# Patient Record
Sex: FEMALE | Race: WHITE | NOT HISPANIC OR LATINO
[De-identification: names, ages, dates, MRNs, and addresses within clinical notes are randomized per-mention and may not be internally consistent; named-entity substitution may affect disease eponyms.]

---

## 2017-02-23 ENCOUNTER — LABORATORY RESULT (OUTPATIENT)
Age: 77
End: 2017-02-23

## 2017-02-28 ENCOUNTER — APPOINTMENT (OUTPATIENT)
Dept: INTERNAL MEDICINE | Facility: CLINIC | Age: 77
End: 2017-02-28

## 2017-02-28 VITALS
DIASTOLIC BLOOD PRESSURE: 70 MMHG | BODY MASS INDEX: 19.92 KG/M2 | SYSTOLIC BLOOD PRESSURE: 136 MMHG | HEART RATE: 91 BPM | WEIGHT: 102 LBS

## 2017-03-03 ENCOUNTER — APPOINTMENT (OUTPATIENT)
Dept: OPHTHALMOLOGY | Facility: CLINIC | Age: 77
End: 2017-03-03

## 2017-03-03 DIAGNOSIS — H26.8 OTHER SPECIFIED CATARACT: ICD-10-CM

## 2017-03-06 ENCOUNTER — APPOINTMENT (OUTPATIENT)
Dept: OPHTHALMOLOGY | Facility: CLINIC | Age: 77
End: 2017-03-06

## 2017-03-06 DIAGNOSIS — H35.3212 EXUDATIVE AGE-RELATED MACULAR DEGENERATION, RIGHT EYE, WITH INACTIVE CHOROIDAL NEOVASCULARIZATION: ICD-10-CM

## 2017-03-06 DIAGNOSIS — H35.3221 EXUDATIVE AGE-RELATED MACULAR DEGENERATION, LEFT EYE, WITH ACTIVE CHOROIDAL NEOVASCULARIZATION: ICD-10-CM

## 2017-03-06 RX ORDER — PREDNISOLONE ACETATE 10 MG/ML
1 SUSPENSION/ DROPS OPHTHALMIC
Qty: 1 | Refills: 4 | Status: ACTIVE | COMMUNITY
Start: 2017-03-06 | End: 1900-01-01

## 2017-03-27 ENCOUNTER — APPOINTMENT (OUTPATIENT)
Dept: OPHTHALMOLOGY | Facility: CLINIC | Age: 77
End: 2017-03-27

## 2017-03-27 DIAGNOSIS — H15.091 OTHER SCLERITIS, RIGHT EYE: ICD-10-CM

## 2017-05-16 ENCOUNTER — APPOINTMENT (OUTPATIENT)
Dept: INTERNAL MEDICINE | Facility: CLINIC | Age: 77
End: 2017-05-16

## 2017-05-16 VITALS
WEIGHT: 106 LBS | HEART RATE: 92 BPM | OXYGEN SATURATION: 97 % | SYSTOLIC BLOOD PRESSURE: 132 MMHG | BODY MASS INDEX: 20.7 KG/M2 | DIASTOLIC BLOOD PRESSURE: 66 MMHG

## 2017-07-03 ENCOUNTER — RX RENEWAL (OUTPATIENT)
Age: 77
End: 2017-07-03

## 2017-08-25 ENCOUNTER — APPOINTMENT (OUTPATIENT)
Dept: INTERNAL MEDICINE | Facility: CLINIC | Age: 77
End: 2017-08-25
Payer: COMMERCIAL

## 2017-08-25 VITALS
BODY MASS INDEX: 19.92 KG/M2 | WEIGHT: 102 LBS | HEART RATE: 86 BPM | OXYGEN SATURATION: 98 % | DIASTOLIC BLOOD PRESSURE: 79 MMHG | SYSTOLIC BLOOD PRESSURE: 141 MMHG

## 2017-08-25 PROCEDURE — 99214 OFFICE O/P EST MOD 30 MIN: CPT

## 2017-09-05 ENCOUNTER — APPOINTMENT (OUTPATIENT)
Dept: INTERNAL MEDICINE | Facility: CLINIC | Age: 77
End: 2017-09-05

## 2017-09-26 ENCOUNTER — APPOINTMENT (OUTPATIENT)
Dept: INTERNAL MEDICINE | Facility: CLINIC | Age: 77
End: 2017-09-26
Payer: COMMERCIAL

## 2017-09-26 VITALS
OXYGEN SATURATION: 97 % | WEIGHT: 103 LBS | BODY MASS INDEX: 20.12 KG/M2 | DIASTOLIC BLOOD PRESSURE: 80 MMHG | HEART RATE: 92 BPM | SYSTOLIC BLOOD PRESSURE: 140 MMHG

## 2017-09-26 PROCEDURE — 99213 OFFICE O/P EST LOW 20 MIN: CPT

## 2018-01-19 ENCOUNTER — APPOINTMENT (OUTPATIENT)
Dept: INTERNAL MEDICINE | Facility: CLINIC | Age: 78
End: 2018-01-19
Payer: COMMERCIAL

## 2018-01-19 VITALS — HEART RATE: 88 BPM | DIASTOLIC BLOOD PRESSURE: 81 MMHG | SYSTOLIC BLOOD PRESSURE: 133 MMHG | OXYGEN SATURATION: 98 %

## 2018-01-19 PROCEDURE — 99214 OFFICE O/P EST MOD 30 MIN: CPT

## 2018-01-29 ENCOUNTER — LABORATORY RESULT (OUTPATIENT)
Age: 78
End: 2018-01-29

## 2018-02-04 ENCOUNTER — RESULT REVIEW (OUTPATIENT)
Age: 78
End: 2018-02-04

## 2018-03-06 ENCOUNTER — MEDICATION RENEWAL (OUTPATIENT)
Age: 78
End: 2018-03-06

## 2018-04-24 LAB
ALBUMIN SERPL ELPH-MCNC: 4.2 G/DL
ALP BLD-CCNC: 104 U/L
ALT SERPL-CCNC: 21 U/L
ANION GAP SERPL CALC-SCNC: 14 MMOL/L
AST SERPL-CCNC: 30 U/L
BASOPHILS # BLD AUTO: 0.33 K/UL
BASOPHILS NFR BLD AUTO: 5.2 %
BILIRUB SERPL-MCNC: 0.5 MG/DL
BUN SERPL-MCNC: 21 MG/DL
CALCIUM SERPL-MCNC: 9.8 MG/DL
CHLORIDE SERPL-SCNC: 102 MMOL/L
CHOLEST SERPL-MCNC: 209 MG/DL
CHOLEST/HDLC SERPL: 2 RATIO
CO2 SERPL-SCNC: 25 MMOL/L
CREAT SERPL-MCNC: 0.63 MG/DL
EOSINOPHIL # BLD AUTO: 0.28 K/UL
EOSINOPHIL NFR BLD AUTO: 4.3
GLUCOSE SERPL-MCNC: 107 MG/DL
HBA1C MFR BLD HPLC: 7.3 %
HCT VFR BLD CALC: 40.5 %
HDLC SERPL-MCNC: 103 MG/DL
HGB BLD-MCNC: 12.9 G/DL
LDLC SERPL CALC-MCNC: 94 MG/DL
LYMPHOCYTES # BLD AUTO: 0.89 K/UL
LYMPHOCYTES NFR BLD AUTO: 13.9 %
MAN DIFF?: NORMAL
MCHC RBC-ENTMCNC: 20.5 PG
MCHC RBC-ENTMCNC: 31.9 GM/DL
MCV RBC AUTO: 64.3 FL
MONOCYTES # BLD AUTO: 0.22 K/UL
MONOCYTES NFR BLD AUTO: 3.5 %
NEUTROPHILS # BLD AUTO: 4.52 K/UL
NEUTROPHILS NFR BLD AUTO: 70.4 %
PLATELET # BLD AUTO: 341 K/UL
POTASSIUM SERPL-SCNC: 5 MMOL/L
PROT SERPL-MCNC: 7.3 G/DL
RBC # BLD: 6.3 M/UL
RBC # FLD: 16.1 %
SODIUM SERPL-SCNC: 141 MMOL/L
TRIGL SERPL-MCNC: 58 MG/DL
TSH SERPL-ACNC: 1.92 UIU/ML
WBC # FLD AUTO: 6.42 K/UL

## 2018-05-22 ENCOUNTER — APPOINTMENT (OUTPATIENT)
Dept: INTERNAL MEDICINE | Facility: CLINIC | Age: 78
End: 2018-05-22
Payer: COMMERCIAL

## 2018-05-22 VITALS
WEIGHT: 103 LBS | DIASTOLIC BLOOD PRESSURE: 72 MMHG | HEART RATE: 79 BPM | OXYGEN SATURATION: 96 % | BODY MASS INDEX: 20.12 KG/M2 | SYSTOLIC BLOOD PRESSURE: 140 MMHG

## 2018-05-22 DIAGNOSIS — M06.9 RHEUMATOID ARTHRITIS, UNSPECIFIED: ICD-10-CM

## 2018-05-22 DIAGNOSIS — R79.9 ABNORMAL FINDING OF BLOOD CHEMISTRY, UNSPECIFIED: ICD-10-CM

## 2018-05-22 PROCEDURE — 99214 OFFICE O/P EST MOD 30 MIN: CPT

## 2018-05-26 NOTE — PLAN
[FreeTextEntry1] : Pt doing generally well.   if sensation difference occasional at le's keeps up or increases may check arterial circulation sonographically, but is instead abating now.  check labs by next month.  to see Cardiology soon for re-evaluation.  keeping active.  minding home FS after recent glipizide time of administration change, but seems to have helped and been safe thus far.

## 2018-05-26 NOTE — HISTORY OF PRESENT ILLNESS
[FreeTextEntry1] : follow-up [de-identified] : Pt has been feeling generally well.  son is coming in to visit this weekend and she is looking forward to this.  she has noted mild sensation of coolness at ankles and feet within last couple of weeks but this seems to be abating.  she has had some more ache at hands with variable and humid weather.  she would like to check bloods next month.  she has been tolerating exertion still well and working constantly.  no cp, no sob. will be seeing Dr. Van for repeat evaluation soon meanwhile.  pt has moved her pm dose of glipizide later in evening and has noted no hypoglycemias as well as better AM control of sugars.

## 2018-05-26 NOTE — PHYSICAL EXAM
[No Acute Distress] : no acute distress [Well Nourished] : well nourished [Well Developed] : well developed [Well-Appearing] : well-appearing [Normal Sclera/Conjunctiva] : normal sclera/conjunctiva [PERRL] : pupils equal round and reactive to light [EOMI] : extraocular movements intact [Normal Outer Ear/Nose] : the outer ears and nose were normal in appearance [Normal Oropharynx] : the oropharynx was normal [No JVD] : no jugular venous distention [Supple] : supple [No Lymphadenopathy] : no lymphadenopathy [Thyroid Normal, No Nodules] : the thyroid was normal and there were no nodules present [No Respiratory Distress] : no respiratory distress  [Clear to Auscultation] : lungs were clear to auscultation bilaterally [No Accessory Muscle Use] : no accessory muscle use [Normal Rate] : normal rate  [Regular Rhythm] : with a regular rhythm [Normal S1, S2] : normal S1 and S2 [No Murmur] : no murmur heard [No Carotid Bruits] : no carotid bruits [No Abdominal Bruit] : a ~M bruit was not heard ~T in the abdomen [No Varicosities] : no varicosities [Pedal Pulses Present] : the pedal pulses are present [No Edema] : there was no peripheral edema [No Extremity Clubbing/Cyanosis] : no extremity clubbing/cyanosis [No Palpable Aorta] : no palpable aorta [Soft] : abdomen soft [Non Tender] : non-tender [Non-distended] : non-distended [No Masses] : no abdominal mass palpated [No HSM] : no HSM [Normal Bowel Sounds] : normal bowel sounds [Normal Posterior Cervical Nodes] : no posterior cervical lymphadenopathy [Normal Anterior Cervical Nodes] : no anterior cervical lymphadenopathy [No CVA Tenderness] : no CVA  tenderness [No Spinal Tenderness] : no spinal tenderness [Grossly Normal Strength/Tone] : grossly normal strength/tone [No Rash] : no rash [Normal Gait] : normal gait [Coordination Grossly Intact] : coordination grossly intact [No Focal Deficits] : no focal deficits [Deep Tendon Reflexes (DTR)] : deep tendon reflexes were 2+ and symmetric [Normal Affect] : the affect was normal [Normal Insight/Judgement] : insight and judgment were intact [Comprehensive Foot Exam Normal] : Right and left foot were examined and both feet are normal. No ulcers in either foot. Toes are normal and with full ROM.  Normal tactile sensation with monofilament testing throughout both feet [de-identified] : mild vascular markings lower le's. dp's 1+ b [de-identified] : fingers with nodularity, rheumatic

## 2018-05-30 ENCOUNTER — NON-APPOINTMENT (OUTPATIENT)
Age: 78
End: 2018-05-30

## 2018-05-30 ENCOUNTER — APPOINTMENT (OUTPATIENT)
Dept: CARDIOLOGY | Facility: CLINIC | Age: 78
End: 2018-05-30
Payer: COMMERCIAL

## 2018-05-30 VITALS
OXYGEN SATURATION: 96 % | HEART RATE: 85 BPM | HEIGHT: 60 IN | SYSTOLIC BLOOD PRESSURE: 146 MMHG | WEIGHT: 105 LBS | BODY MASS INDEX: 20.62 KG/M2 | DIASTOLIC BLOOD PRESSURE: 68 MMHG

## 2018-05-30 DIAGNOSIS — R00.2 PALPITATIONS: ICD-10-CM

## 2018-05-30 PROCEDURE — 93000 ELECTROCARDIOGRAM COMPLETE: CPT

## 2018-05-30 PROCEDURE — 99214 OFFICE O/P EST MOD 30 MIN: CPT

## 2018-06-15 ENCOUNTER — LABORATORY RESULT (OUTPATIENT)
Age: 78
End: 2018-06-15

## 2018-06-20 LAB
ALBUMIN SERPL ELPH-MCNC: 4.3 G/DL
ALP BLD-CCNC: 99 U/L
ALT SERPL-CCNC: 19 U/L
ANION GAP SERPL CALC-SCNC: 17 MMOL/L
AST SERPL-CCNC: 21 U/L
BASOPHILS # BLD AUTO: 0.06 K/UL
BASOPHILS NFR BLD AUTO: 0.8 %
BILIRUB SERPL-MCNC: 0.5 MG/DL
BUN SERPL-MCNC: 28 MG/DL
CALCIUM SERPL-MCNC: 9.2 MG/DL
CHLORIDE SERPL-SCNC: 100 MMOL/L
CO2 SERPL-SCNC: 24 MMOL/L
CREAT SERPL-MCNC: 0.6 MG/DL
CREAT SPEC-SCNC: 32 MG/DL
EOSINOPHIL # BLD AUTO: 0.16 K/UL
EOSINOPHIL NFR BLD AUTO: 2.2 %
FOLATE SERPL-MCNC: 14.7 NG/ML
GLUCOSE SERPL-MCNC: 121 MG/DL
HBA1C MFR BLD HPLC: 7.2 %
HCT VFR BLD CALC: 39.9 %
HGB BLD-MCNC: 12.4 G/DL
IMM GRANULOCYTES NFR BLD AUTO: 0.4 %
LYMPHOCYTES # BLD AUTO: 1.6 K/UL
LYMPHOCYTES NFR BLD AUTO: 22.1 %
MAN DIFF?: NORMAL
MCHC RBC-ENTMCNC: 19.9 PG
MCHC RBC-ENTMCNC: 31.1 GM/DL
MCV RBC AUTO: 64.1 FL
MICROALBUMIN 24H UR DL<=1MG/L-MCNC: 0.4 MG/DL
MICROALBUMIN/CREAT 24H UR-RTO: 13 MG/G
MONOCYTES # BLD AUTO: 0.47 K/UL
MONOCYTES NFR BLD AUTO: 6.5 %
NEUTROPHILS # BLD AUTO: 4.92 K/UL
NEUTROPHILS NFR BLD AUTO: 68 %
PLATELET # BLD AUTO: 341 K/UL
POTASSIUM SERPL-SCNC: 5.1 MMOL/L
PROT SERPL-MCNC: 6.7 G/DL
RBC # BLD: 6.22 M/UL
RBC # FLD: 16.9 %
SODIUM SERPL-SCNC: 141 MMOL/L
VIT B12 SERPL-MCNC: 532 PG/ML
WBC # FLD AUTO: 7.24 K/UL

## 2018-07-18 ENCOUNTER — EMERGENCY (EMERGENCY)
Facility: HOSPITAL | Age: 78
LOS: 1 days | Discharge: HOME | End: 2018-07-18
Attending: EMERGENCY MEDICINE
Payer: COMMERCIAL

## 2018-07-18 VITALS
TEMPERATURE: 98 F | WEIGHT: 104.06 LBS | DIASTOLIC BLOOD PRESSURE: 76 MMHG | HEART RATE: 113 BPM | HEIGHT: 64 IN | OXYGEN SATURATION: 97 % | SYSTOLIC BLOOD PRESSURE: 176 MMHG | RESPIRATION RATE: 18 BRPM

## 2018-07-18 VITALS
SYSTOLIC BLOOD PRESSURE: 152 MMHG | HEART RATE: 89 BPM | OXYGEN SATURATION: 97 % | DIASTOLIC BLOOD PRESSURE: 74 MMHG | RESPIRATION RATE: 18 BRPM

## 2018-07-18 PROCEDURE — 73590 X-RAY EXAM OF LOWER LEG: CPT | Mod: 26,LT

## 2018-07-18 PROCEDURE — 73590 X-RAY EXAM OF LOWER LEG: CPT

## 2018-07-18 PROCEDURE — 99283 EMERGENCY DEPT VISIT LOW MDM: CPT

## 2018-07-18 RX ORDER — ACETAMINOPHEN 500 MG
975 TABLET ORAL ONCE
Qty: 0 | Refills: 0 | Status: COMPLETED | OUTPATIENT
Start: 2018-07-18 | End: 2018-07-18

## 2018-07-18 RX ADMIN — Medication 975 MILLIGRAM(S): at 13:37

## 2018-07-18 RX ADMIN — Medication 975 MILLIGRAM(S): at 12:42

## 2018-07-18 NOTE — ED PROVIDER NOTE - OBJECTIVE STATEMENT
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 leg hematoma that developed after opening the car door of her new silver [place] on her leg. Patient states that the incident occurred at 8 am and she was able to walk here and even drove herself here. Patient tried icing the hematoma and visited her podiatrist who recommended she come to ER for evaluation. 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 leg hematoma that developed after opening the car door of her new silver Saira on her leg. Patient states that the incident occurred at 8 am and she was able to walk here and even drove herself here. Patient tried icing the hematoma and visited her podiatrist who recommended she come to ER for evaluation.

## 2018-07-18 NOTE — ED PROVIDER NOTE - NS ED ROS FT
General: no fever, no chills  Ophthalmologic: no change in vision  ENMT: no sore throat  Respiratory and Thorax: no SOB, no cough  Cardiovascular: no CP  Gastrointestinal: no abd pain, N/V/D  Genitourinary: no dysuria  Musculoskeletal: +burning pain of RLE, hematoma of RLE, bruising.   Neurological: no HA  Psychiatric: no anxiety/depression  Hematology/Lymphatics: no abnormal bleeding  Endocrine: no changes in weight General: no fever, no chills  Ophthalmologic: no change in vision  ENMT: no sore throat  Respiratory and Thorax: no SOB, no cough  Cardiovascular: no CP  Gastrointestinal: no abd pain, N/V/D  Genitourinary: no dysuria  Musculoskeletal: +burning pain of LLE, hematoma of LLE, bruising.   Neurological: no HA  Psychiatric: no anxiety/depression  Hematology/Lymphatics: no abnormal bleeding  Endocrine: no changes in weight

## 2018-07-18 NOTE — ED PROVIDER NOTE - PHYSICAL EXAMINATION
Attending MD Brumfield: A & O x 3, NAD, extremities with no edema; LLE: large hematoma of proximal shin lateral aspect, hematoma not tense, posterior and medial calf compartments soft, distally NV intact b/l LEs, nontender left knee, left thigh and left hip. skin intact b/l LEs, affect appropriate. neuro exam non focal with no motor or sensory deficits.

## 2018-07-18 NOTE — ED ADULT NURSE NOTE - OBJECTIVE STATEMENT
78 y/o F, PMH HTN, DM, osteoarthritis, Rheumatoid arthritis, presents ambulatory to ED c/o L lateral upper shin pain/swelling/bruising. Pt reports at approx 0800 she opened the car door into her L lateral upper shin. Pt takes 1 baby asa/day. Pt reports she noticed the bruising/swelling developed over the next 15 minutes but she had to finish her laundry, she applied ice, went to the Podiatrist who had to cut her toenails, he also applied ice, recommended she come into the ED. Pt has mild redness to b/l lower ankles, states "my doctor told me I have vasculitis, sometimes it itches so I put witch hazel on it." No streaking noted to lower legs b/l. No bleeding, opening, abrasion, lac, drainage noted to L lateral upper shin bruise. Pt reports baseline numbness/tingling r/t DM, no worsening. Pt ambulates intermittently with cane when her osteoarthritis is worse, pt came in with cane today. Safety maintained.

## 2018-07-18 NOTE — ED PROVIDER NOTE - MEDICAL DECISION MAKING DETAILS
Attending MD Brumfield: 77F with hematoma to left shin after striking it with car door 4 hours PTA, exam with localized soft tissue hematoma to left shin, calf comparments soft, no e/o compartment syndrome currently. Pt on ASA daily, no other anticoagulation. Plan for XR to ro underlying fx, expectant management with compressive bandage, elevation

## 2018-07-18 NOTE — ED PROVIDER NOTE - CARE PLAN
Principal Discharge DX:	Hematoma of left lower extremity, initial encounter Principal Discharge DX:	Hematoma of left lower extremity, initial encounter  Assessment and plan of treatment:	-xray negative for fracture  -discharge home with compressive dressing

## 2018-07-18 NOTE — ED ADULT TRIAGE NOTE - CHIEF COMPLAINT QUOTE
left leg injury this AM. patient states she opened up car door in to leg. partial weight bearing. bruising and swelling noted to anterior lower left extremity

## 2018-07-18 NOTE — ED PROVIDER NOTE - ATTENDING CONTRIBUTION TO CARE
Attending MD Brumfield:  I personally have seen and examined this patient.  Resident note reviewed and agree on plan of care and except where noted.  See HPI, PE, and MDM for details.

## 2018-07-18 NOTE — ED ADULT NURSE NOTE - CHPI ED SYMPTOMS NEG
no bleeding/no chills/no bleeding at site/no purulent drainage/no vomiting/no fever/no drainage/no red streaks

## 2018-07-18 NOTE — ED PROVIDER NOTE - PROGRESS NOTE DETAILS
Patient able to ambulate with her cane to bathroom. Attending MD Brumfield: XR prelim negative for fx. Re-examination of left shin reveals stable hematoma size, not tense. ACE wrap applied. Patient instructed to hold aspirin until cleared to resume by her PMD

## 2018-07-20 ENCOUNTER — APPOINTMENT (OUTPATIENT)
Dept: INTERNAL MEDICINE | Facility: CLINIC | Age: 78
End: 2018-07-20
Payer: COMMERCIAL

## 2018-07-20 PROCEDURE — 99214 OFFICE O/P EST MOD 30 MIN: CPT

## 2018-07-24 ENCOUNTER — APPOINTMENT (OUTPATIENT)
Dept: INTERNAL MEDICINE | Facility: CLINIC | Age: 78
End: 2018-07-24
Payer: COMMERCIAL

## 2018-07-24 VITALS
WEIGHT: 108 LBS | BODY MASS INDEX: 21.09 KG/M2 | HEART RATE: 92 BPM | OXYGEN SATURATION: 98 % | SYSTOLIC BLOOD PRESSURE: 130 MMHG | DIASTOLIC BLOOD PRESSURE: 62 MMHG

## 2018-07-24 PROCEDURE — 99214 OFFICE O/P EST MOD 30 MIN: CPT

## 2018-07-25 ENCOUNTER — FORM ENCOUNTER (OUTPATIENT)
Age: 78
End: 2018-07-25

## 2018-07-26 ENCOUNTER — OUTPATIENT (OUTPATIENT)
Dept: OUTPATIENT SERVICES | Facility: HOSPITAL | Age: 78
LOS: 1 days | End: 2018-07-26
Payer: COMMERCIAL

## 2018-07-26 ENCOUNTER — APPOINTMENT (OUTPATIENT)
Dept: ULTRASOUND IMAGING | Facility: CLINIC | Age: 78
End: 2018-07-26

## 2018-07-26 DIAGNOSIS — T14.8XXA OTHER INJURY OF UNSPECIFIED BODY REGION, INITIAL ENCOUNTER: ICD-10-CM

## 2018-07-26 PROCEDURE — 93971 EXTREMITY STUDY: CPT

## 2018-07-26 PROCEDURE — 93971 EXTREMITY STUDY: CPT | Mod: 26

## 2018-07-27 ENCOUNTER — MEDICATION RENEWAL (OUTPATIENT)
Age: 78
End: 2018-07-27

## 2018-08-01 NOTE — HISTORY OF PRESENT ILLNESS
[FreeTextEntry1] : hematoma left leg [de-identified] : Pt comes in following from ED visit for hematoma l le. developed a large hematoma at anterolateral aspect l foreleg after opened car door and struck this part of the leg abruptly.  she went to ed because it developed rapidly into large protruding hematoma.  xr showed no fx underlying, and it was determined that she was not likely to have venous thrombus nor compartment syndrome.  she had initially more pain, then more itching.  was told to stop asa for now.  has had some bluish coloration and some redness below hematoma but no fever, no major alteration in blood glucose readings.

## 2018-08-01 NOTE — PLAN
[FreeTextEntry1] : will hold asa x 2 weeks, elevate leg whenever able, use ice (covered in cloth to minimize impact upon skin itself) upon hematoma to help resolve more swiftly.  will limit activities to a degree over next several days. anticipate ecchymosis tracking inferiorly but pt will report in if with increase in erythema, heat, body temp, abnl glucoses, or if with mounting pain inferiorly or at level of current hematoma.  near-term follow-up in office.

## 2018-08-01 NOTE — PHYSICAL EXAM
[No Acute Distress] : no acute distress [Well Nourished] : well nourished [Well Developed] : well developed [Well-Appearing] : well-appearing [Normal Sclera/Conjunctiva] : normal sclera/conjunctiva [PERRL] : pupils equal round and reactive to light [EOMI] : extraocular movements intact [Normal Outer Ear/Nose] : the outer ears and nose were normal in appearance [Normal Oropharynx] : the oropharynx was normal [No JVD] : no jugular venous distention [Supple] : supple [No Lymphadenopathy] : no lymphadenopathy [Thyroid Normal, No Nodules] : the thyroid was normal and there were no nodules present [No Respiratory Distress] : no respiratory distress  [Clear to Auscultation] : lungs were clear to auscultation bilaterally [No Accessory Muscle Use] : no accessory muscle use [Normal Rate] : normal rate  [Regular Rhythm] : with a regular rhythm [Normal S1, S2] : normal S1 and S2 [No Murmur] : no murmur heard [No Carotid Bruits] : no carotid bruits [No Abdominal Bruit] : a ~M bruit was not heard ~T in the abdomen [No Varicosities] : no varicosities [Pedal Pulses Present] : the pedal pulses are present [No Edema] : there was no peripheral edema [No Extremity Clubbing/Cyanosis] : no extremity clubbing/cyanosis [No Palpable Aorta] : no palpable aorta [Soft] : abdomen soft [Non Tender] : non-tender [Non-distended] : non-distended [No Masses] : no abdominal mass palpated [No HSM] : no HSM [Normal Bowel Sounds] : normal bowel sounds [Normal Posterior Cervical Nodes] : no posterior cervical lymphadenopathy [Normal Anterior Cervical Nodes] : no anterior cervical lymphadenopathy [No CVA Tenderness] : no CVA  tenderness [No Spinal Tenderness] : no spinal tenderness [Grossly Normal Strength/Tone] : grossly normal strength/tone [No Rash] : no rash [Normal Gait] : normal gait [Coordination Grossly Intact] : coordination grossly intact [No Focal Deficits] : no focal deficits [Deep Tendon Reflexes (DTR)] : deep tendon reflexes were 2+ and symmetric [Normal Affect] : the affect was normal [Normal Insight/Judgement] : insight and judgment were intact [Comprehensive Foot Exam Normal] : Right and left foot were examined and both feet are normal. No ulcers in either foot. Toes are normal and with full ROM.  Normal tactile sensation with monofilament testing throughout both feet [de-identified] : large hematoma l anterolateral foreleg - approx 6 cm x 4 cm.  mild erythema inferior to this, some tracking ecchymosis inferior to this.  skin remains pliable around remainder of leg.  no abnl warmth at areas erythema. dp's 1+ b u nchanged. [de-identified] : fingers with nodularity, rheumatic

## 2018-08-05 NOTE — HISTORY OF PRESENT ILLNESS
[FreeTextEntry1] : follow up hematoma [de-identified] : Pt has noted reduction in size of hematoma though remains itchy.  has been a bit more red beneath site however from time to time as well as more bluish.  ice does not make it feel better at this point.  she has had no fevers, no drainage, no bleeding.

## 2018-08-05 NOTE — ASSESSMENT
[FreeTextEntry1] : Pt with resolving hematoma secondary to trauma at lower l leg.  persistent swelling, some increase in erythema; no horace signs of dvt but will check sono at this point.  pt requests abx to have on hand should develop greater pain/fevers/erythema at site but her ability to take abx is limited by multiple tolerability issues.  will provide with multiple caveats - no need to apply medication tx as yet but will speak again after sono performed.  leg elevation, easy leg stretching, local heat.

## 2018-08-05 NOTE — PHYSICAL EXAM
[No Acute Distress] : no acute distress [Well Nourished] : well nourished [Well Developed] : well developed [Well-Appearing] : well-appearing [Normal Sclera/Conjunctiva] : normal sclera/conjunctiva [PERRL] : pupils equal round and reactive to light [EOMI] : extraocular movements intact [Normal Outer Ear/Nose] : the outer ears and nose were normal in appearance [Normal Oropharynx] : the oropharynx was normal [No JVD] : no jugular venous distention [Supple] : supple [No Lymphadenopathy] : no lymphadenopathy [Thyroid Normal, No Nodules] : the thyroid was normal and there were no nodules present [No Respiratory Distress] : no respiratory distress  [Clear to Auscultation] : lungs were clear to auscultation bilaterally [No Accessory Muscle Use] : no accessory muscle use [Normal Rate] : normal rate  [Regular Rhythm] : with a regular rhythm [Normal S1, S2] : normal S1 and S2 [No Murmur] : no murmur heard [No Carotid Bruits] : no carotid bruits [No Abdominal Bruit] : a ~M bruit was not heard ~T in the abdomen [No Varicosities] : no varicosities [Pedal Pulses Present] : the pedal pulses are present [No Edema] : there was no peripheral edema [No Extremity Clubbing/Cyanosis] : no extremity clubbing/cyanosis [No Palpable Aorta] : no palpable aorta [Soft] : abdomen soft [Non Tender] : non-tender [Non-distended] : non-distended [No Masses] : no abdominal mass palpated [No HSM] : no HSM [Normal Bowel Sounds] : normal bowel sounds [Normal Posterior Cervical Nodes] : no posterior cervical lymphadenopathy [Normal Anterior Cervical Nodes] : no anterior cervical lymphadenopathy [No CVA Tenderness] : no CVA  tenderness [No Spinal Tenderness] : no spinal tenderness [Grossly Normal Strength/Tone] : grossly normal strength/tone [No Rash] : no rash [Normal Gait] : normal gait [Coordination Grossly Intact] : coordination grossly intact [No Focal Deficits] : no focal deficits [Deep Tendon Reflexes (DTR)] : deep tendon reflexes were 2+ and symmetric [Normal Affect] : the affect was normal [Normal Insight/Judgement] : insight and judgment were intact [Comprehensive Foot Exam Normal] : Right and left foot were examined and both feet are normal. No ulcers in either foot. Toes are normal and with full ROM.  Normal tactile sensation with monofilament testing throughout both feet [de-identified] : large hematoma l anterolateral foreleg - approx 4 cm x 3 cm.  mild erythema inferior to this, some tracking ecchymosis inferior to this.  skin remains pliable around remainder of leg.  no abnl warmth at areas erythema. dp's 1+ b u nchanged.  slightly greater edema inf to hematoma c/w r le. [de-identified] : fingers with nodularity, rheumatic

## 2018-08-13 ENCOUNTER — APPOINTMENT (OUTPATIENT)
Dept: INTERNAL MEDICINE | Facility: CLINIC | Age: 78
End: 2018-08-13
Payer: COMMERCIAL

## 2018-08-13 VITALS
WEIGHT: 107 LBS | DIASTOLIC BLOOD PRESSURE: 60 MMHG | BODY MASS INDEX: 20.9 KG/M2 | SYSTOLIC BLOOD PRESSURE: 128 MMHG | HEART RATE: 94 BPM | OXYGEN SATURATION: 96 %

## 2018-08-13 DIAGNOSIS — T14.8XXA OTHER INJURY OF UNSPECIFIED BODY REGION, INITIAL ENCOUNTER: ICD-10-CM

## 2018-08-13 PROCEDURE — 99214 OFFICE O/P EST MOD 30 MIN: CPT

## 2018-08-19 PROBLEM — T14.8XXA HEMATOMA: Status: ACTIVE | Noted: 2018-07-24

## 2018-08-19 NOTE — PLAN
[FreeTextEntry1] : hematoma is decreasing in size to some degree, but is still notable.  pt today notes some consistency to sensory deficit in small region directly inferior to hematoma.  no fever, no motor function deficit.  she will continue to elevate, use some local heat. encouraging light compression as well.  however, lack of momentum towards [place] resolution of this hematoma, creating local nn. compression of note -- will refer to General Surgery for assessment, possible hematoma evacuation.  glucoses have remained at baseline, mostly nl range.

## 2018-08-19 NOTE — PHYSICAL EXAM
[No Acute Distress] : no acute distress [Well Nourished] : well nourished [Well Developed] : well developed [Well-Appearing] : well-appearing [Normal Sclera/Conjunctiva] : normal sclera/conjunctiva [PERRL] : pupils equal round and reactive to light [EOMI] : extraocular movements intact [Normal Outer Ear/Nose] : the outer ears and nose were normal in appearance [Normal Oropharynx] : the oropharynx was normal [No JVD] : no jugular venous distention [Supple] : supple [No Lymphadenopathy] : no lymphadenopathy [Thyroid Normal, No Nodules] : the thyroid was normal and there were no nodules present [No Respiratory Distress] : no respiratory distress  [Clear to Auscultation] : lungs were clear to auscultation bilaterally [No Accessory Muscle Use] : no accessory muscle use [Normal Rate] : normal rate  [Regular Rhythm] : with a regular rhythm [Normal S1, S2] : normal S1 and S2 [No Murmur] : no murmur heard [No Carotid Bruits] : no carotid bruits [No Abdominal Bruit] : a ~M bruit was not heard ~T in the abdomen [No Varicosities] : no varicosities [Pedal Pulses Present] : the pedal pulses are present [No Edema] : there was no peripheral edema [No Extremity Clubbing/Cyanosis] : no extremity clubbing/cyanosis [No Palpable Aorta] : no palpable aorta [Soft] : abdomen soft [Non Tender] : non-tender [Non-distended] : non-distended [No Masses] : no abdominal mass palpated [No HSM] : no HSM [Normal Bowel Sounds] : normal bowel sounds [Normal Posterior Cervical Nodes] : no posterior cervical lymphadenopathy [Normal Anterior Cervical Nodes] : no anterior cervical lymphadenopathy [No CVA Tenderness] : no CVA  tenderness [No Spinal Tenderness] : no spinal tenderness [Grossly Normal Strength/Tone] : grossly normal strength/tone [No Rash] : no rash [Normal Gait] : normal gait [Coordination Grossly Intact] : coordination grossly intact [No Focal Deficits] : no focal deficits [Deep Tendon Reflexes (DTR)] : deep tendon reflexes were 2+ and symmetric [Normal Affect] : the affect was normal [Normal Insight/Judgement] : insight and judgment were intact [Comprehensive Foot Exam Normal] : Right and left foot were examined and both feet are normal. No ulcers in either foot. Toes are normal and with full ROM.  Normal tactile sensation with monofilament testing throughout both feet [de-identified] : large hematoma l anterolateral foreleg - approx 3 cm x 3 cm.  mild erythema inferior to this, fades with elevation.  skin remains pliable around remainder of leg.  no abnl warmth at areas erythema. dp's 1+ b u nchanged.  [de-identified] : fingers with nodularity, rheumatic

## 2018-08-19 NOTE — HISTORY OF PRESENT ILLNESS
[FreeTextEntry1] : follow up hematoma [de-identified] : Pt's leg hematoma is still present.  it is getting softer, more pliable, but is still prominent. it still seems to generate occasional dysesthesias/paresthesias.  has not led to motor impairment.  however she notes some decrease in sensation at area which she describes as a vertically oriented rectangle inferior to the hematoma. she has used local heat, elevation. has not been using light compression. denies fevers. in care of the region, has not disrupted at all.  skin intact.  tendency to mild erythema at area but also remits easily.  has not diminished her activities or schedule.  she continues engage in full work and social activities though it does trouble her just about all the time.

## 2018-08-21 ENCOUNTER — APPOINTMENT (OUTPATIENT)
Dept: SURGERY | Facility: CLINIC | Age: 78
End: 2018-08-21
Payer: COMMERCIAL

## 2018-08-21 VITALS
WEIGHT: 106 LBS | HEIGHT: 64 IN | SYSTOLIC BLOOD PRESSURE: 146 MMHG | DIASTOLIC BLOOD PRESSURE: 76 MMHG | TEMPERATURE: 98.3 F | BODY MASS INDEX: 18.1 KG/M2 | HEART RATE: 89 BPM

## 2018-08-21 PROCEDURE — 99241 OFFICE CONSULTATION NEW/ESTAB PATIENT 15 MIN: CPT

## 2018-08-23 ENCOUNTER — MEDICATION RENEWAL (OUTPATIENT)
Age: 78
End: 2018-08-23

## 2018-08-29 ENCOUNTER — APPOINTMENT (OUTPATIENT)
Dept: SURGERY | Facility: CLINIC | Age: 78
End: 2018-08-29
Payer: COMMERCIAL

## 2018-08-29 PROCEDURE — 99213 OFFICE O/P EST LOW 20 MIN: CPT

## 2018-08-31 ENCOUNTER — RESULT CHARGE (OUTPATIENT)
Age: 78
End: 2018-08-31

## 2018-08-31 ENCOUNTER — APPOINTMENT (OUTPATIENT)
Dept: INTERNAL MEDICINE | Facility: CLINIC | Age: 78
End: 2018-08-31

## 2018-10-01 ENCOUNTER — EMERGENCY (EMERGENCY)
Facility: HOSPITAL | Age: 78
LOS: 1 days | Discharge: HOME | End: 2018-10-01
Attending: EMERGENCY MEDICINE
Payer: COMMERCIAL

## 2018-10-01 VITALS
HEART RATE: 100 BPM | OXYGEN SATURATION: 100 % | RESPIRATION RATE: 16 BRPM | SYSTOLIC BLOOD PRESSURE: 138 MMHG | DIASTOLIC BLOOD PRESSURE: 68 MMHG

## 2018-10-01 VITALS
DIASTOLIC BLOOD PRESSURE: 74 MMHG | SYSTOLIC BLOOD PRESSURE: 181 MMHG | RESPIRATION RATE: 18 BRPM | TEMPERATURE: 98 F | HEART RATE: 111 BPM | OXYGEN SATURATION: 97 %

## 2018-10-01 DIAGNOSIS — Z98.891 HISTORY OF UTERINE SCAR FROM PREVIOUS SURGERY: Chronic | ICD-10-CM

## 2018-10-01 DIAGNOSIS — N93.9 ABNORMAL UTERINE AND VAGINAL BLEEDING, UNSPECIFIED: ICD-10-CM

## 2018-10-01 LAB
ALBUMIN SERPL ELPH-MCNC: 3.4 G/DL — SIGNIFICANT CHANGE UP (ref 3.3–5)
ALP SERPL-CCNC: 79 U/L — SIGNIFICANT CHANGE UP (ref 40–120)
ALT FLD-CCNC: 10 U/L — SIGNIFICANT CHANGE UP (ref 10–45)
ANION GAP SERPL CALC-SCNC: 12 MMOL/L — SIGNIFICANT CHANGE UP (ref 5–17)
APTT BLD: 30.6 SEC — SIGNIFICANT CHANGE UP (ref 27.5–37.4)
AST SERPL-CCNC: 15 U/L — SIGNIFICANT CHANGE UP (ref 10–40)
BASOPHILS # BLD AUTO: 0.1 K/UL — SIGNIFICANT CHANGE UP (ref 0–0.2)
BASOPHILS NFR BLD AUTO: 0.8 % — SIGNIFICANT CHANGE UP (ref 0–2)
BILIRUB SERPL-MCNC: 0.3 MG/DL — SIGNIFICANT CHANGE UP (ref 0.2–1.2)
BLD GP AB SCN SERPL QL: NEGATIVE — SIGNIFICANT CHANGE UP
BUN SERPL-MCNC: 17 MG/DL — SIGNIFICANT CHANGE UP (ref 7–23)
CALCIUM SERPL-MCNC: 9 MG/DL — SIGNIFICANT CHANGE UP (ref 8.4–10.5)
CHLORIDE SERPL-SCNC: 100 MMOL/L — SIGNIFICANT CHANGE UP (ref 96–108)
CO2 SERPL-SCNC: 23 MMOL/L — SIGNIFICANT CHANGE UP (ref 22–31)
CREAT SERPL-MCNC: 0.48 MG/DL — LOW (ref 0.5–1.3)
EOSINOPHIL # BLD AUTO: 0.1 K/UL — SIGNIFICANT CHANGE UP (ref 0–0.5)
EOSINOPHIL NFR BLD AUTO: 1.8 % — SIGNIFICANT CHANGE UP (ref 0–6)
GAS PNL BLDV: SIGNIFICANT CHANGE UP
GLUCOSE SERPL-MCNC: 166 MG/DL — HIGH (ref 70–99)
HCT VFR BLD CALC: 35.3 % — SIGNIFICANT CHANGE UP (ref 34.5–45)
HCT VFR BLD CALC: 37.8 % — SIGNIFICANT CHANGE UP (ref 34.5–45)
HGB BLD-MCNC: 11.1 G/DL — LOW (ref 11.5–15.5)
HGB BLD-MCNC: 11.9 G/DL — SIGNIFICANT CHANGE UP (ref 11.5–15.5)
INR BLD: 1.17 RATIO — HIGH (ref 0.88–1.16)
LYMPHOCYTES # BLD AUTO: 1 K/UL — SIGNIFICANT CHANGE UP (ref 1–3.3)
LYMPHOCYTES # BLD AUTO: 13.4 % — SIGNIFICANT CHANGE UP (ref 13–44)
MCHC RBC-ENTMCNC: 20.3 PG — LOW (ref 27–34)
MCHC RBC-ENTMCNC: 20.3 PG — LOW (ref 27–34)
MCHC RBC-ENTMCNC: 31.5 GM/DL — LOW (ref 32–36)
MCHC RBC-ENTMCNC: 31.6 GM/DL — LOW (ref 32–36)
MCV RBC AUTO: 64.5 FL — LOW (ref 80–100)
MCV RBC AUTO: 64.6 FL — LOW (ref 80–100)
MONOCYTES # BLD AUTO: 0.7 K/UL — SIGNIFICANT CHANGE UP (ref 0–0.9)
MONOCYTES NFR BLD AUTO: 9.5 % — SIGNIFICANT CHANGE UP (ref 2–14)
NEUTROPHILS # BLD AUTO: 5.5 K/UL — SIGNIFICANT CHANGE UP (ref 1.8–7.4)
NEUTROPHILS NFR BLD AUTO: 74.5 % — SIGNIFICANT CHANGE UP (ref 43–77)
PLATELET # BLD AUTO: 436 K/UL — HIGH (ref 150–400)
PLATELET # BLD AUTO: 493 K/UL — HIGH (ref 150–400)
POTASSIUM SERPL-MCNC: 4.2 MMOL/L — SIGNIFICANT CHANGE UP (ref 3.5–5.3)
POTASSIUM SERPL-SCNC: 4.2 MMOL/L — SIGNIFICANT CHANGE UP (ref 3.5–5.3)
PROT SERPL-MCNC: 6.7 G/DL — SIGNIFICANT CHANGE UP (ref 6–8.3)
PROTHROM AB SERPL-ACNC: 12.8 SEC — HIGH (ref 9.8–12.7)
RBC # BLD: 5.45 M/UL — HIGH (ref 3.8–5.2)
RBC # BLD: 5.87 M/UL — HIGH (ref 3.8–5.2)
RBC # FLD: 15.2 % — HIGH (ref 10.3–14.5)
RBC # FLD: 15.5 % — HIGH (ref 10.3–14.5)
RH IG SCN BLD-IMP: POSITIVE — SIGNIFICANT CHANGE UP
SODIUM SERPL-SCNC: 135 MMOL/L — SIGNIFICANT CHANGE UP (ref 135–145)
WBC # BLD: 7.4 K/UL — SIGNIFICANT CHANGE UP (ref 3.8–10.5)
WBC # BLD: 9 K/UL — SIGNIFICANT CHANGE UP (ref 3.8–10.5)
WBC # FLD AUTO: 7.4 K/UL — SIGNIFICANT CHANGE UP (ref 3.8–10.5)
WBC # FLD AUTO: 9 K/UL — SIGNIFICANT CHANGE UP (ref 3.8–10.5)

## 2018-10-01 PROCEDURE — 99284 EMERGENCY DEPT VISIT MOD MDM: CPT

## 2018-10-01 PROCEDURE — 86900 BLOOD TYPING SEROLOGIC ABO: CPT

## 2018-10-01 PROCEDURE — 85730 THROMBOPLASTIN TIME PARTIAL: CPT

## 2018-10-01 PROCEDURE — 85610 PROTHROMBIN TIME: CPT

## 2018-10-01 PROCEDURE — 99284 EMERGENCY DEPT VISIT MOD MDM: CPT | Mod: 25

## 2018-10-01 PROCEDURE — 76856 US EXAM PELVIC COMPLETE: CPT | Mod: 26,59

## 2018-10-01 PROCEDURE — 86850 RBC ANTIBODY SCREEN: CPT

## 2018-10-01 PROCEDURE — 74177 CT ABD & PELVIS W/CONTRAST: CPT | Mod: 26

## 2018-10-01 PROCEDURE — 86901 BLOOD TYPING SEROLOGIC RH(D): CPT

## 2018-10-01 PROCEDURE — 93975 VASCULAR STUDY: CPT

## 2018-10-01 PROCEDURE — 85027 COMPLETE CBC AUTOMATED: CPT

## 2018-10-01 PROCEDURE — 74177 CT ABD & PELVIS W/CONTRAST: CPT

## 2018-10-01 PROCEDURE — 93975 VASCULAR STUDY: CPT | Mod: 26

## 2018-10-01 PROCEDURE — 76856 US EXAM PELVIC COMPLETE: CPT

## 2018-10-01 PROCEDURE — 80053 COMPREHEN METABOLIC PANEL: CPT

## 2018-10-01 RX ORDER — SODIUM CHLORIDE 9 MG/ML
1000 INJECTION INTRAMUSCULAR; INTRAVENOUS; SUBCUTANEOUS ONCE
Qty: 0 | Refills: 0 | Status: COMPLETED | OUTPATIENT
Start: 2018-10-01 | End: 2018-10-01

## 2018-10-01 RX ADMIN — SODIUM CHLORIDE 1000 MILLILITER(S): 9 INJECTION INTRAMUSCULAR; INTRAVENOUS; SUBCUTANEOUS at 05:36

## 2018-10-01 NOTE — CONSULT NOTE ADULT - ASSESSMENT
78yoF postmenopausal  presents with vaginal bleeding onset overnight. Currently stable and scheduled to begin outpatient gyn/onc evaluation tomorrow.

## 2018-10-01 NOTE — ED PROVIDER NOTE - ATTENDING CONTRIBUTION TO CARE
79 yo female with recently diagnosed "pelvis mass" and pelvic pain for several weeks now presents with significant vaginal bleeding.  Dark blood pooled in vaginal vault on exam.  Hemodynamically stable.  Will check labs, coags, transvaginal ultrasound + CT, OB consult and reassess.

## 2018-10-01 NOTE — ED ADULT NURSE NOTE - OBJECTIVE STATEMENT
78y female presents to ED complaining of vaginal bleeding. Pt is a/ox3 states that she awoke from sleep in a puddle of blood and passed a large clot into the toliet 78y female presents to ED complaining of vaginal bleeding. Pt is a/ox3 states that she awoke from sleep in a puddle of blood and passed a large clot into the toliet. Pt is complaining of LLQ pain and cramping. 78y female presents to ED complaining of vaginal bleeding. Pt is a/ox3 states that she awoke from sleep in a puddle of blood and passed a large clot into the toilet. Pt is complaining of LLQ pain and cramping. Pt denies dizziness, lightheadedness or SOB. Pt states she had a CT and US done outpatient with possible "mass/fibroids". Pt breathing spontaneous and unlabored with lungs clear to auscultation bilaterally. Pt skin is warm, dry and intact with no edema present. Pt abdomen soft, tender in pelvic regions and RLQ/LLQ, distended with bowel sounds present in all 4 quadrants. Pt PERRL, with equal strength bilaterally in upper and lower extremities with full sensation. Pt denies chest pain and SOB, denies n/v/d, denies fever/chills and cough, denies dysuria, denies numbness/tingling and weakness.

## 2018-10-01 NOTE — ED PROVIDER NOTE - MEDICAL DECISION MAKING DETAILS
PGY1/MD Alycia. 77 yo F, recently diagnosed intrapelvic mass (no biopsy), who has an appointment with Dr. Harrington this Tuesday, presents with vaginal bleeding that stated just this morning. Vital signs are stable, trans vaginal us+CT, consult for OBG

## 2018-10-01 NOTE — CONSULT NOTE ADULT - PROBLEM SELECTOR RECOMMENDATION 9
- initial CBC WNL; recommend repeat prior to discharge  - pooled dark blood on speculum exam but no active bleeding and patient denying any other sxs  - hypertensive on vitals but h/o HTN; continue to monitor VS while in ED  - imaging demonstrating large adnexal mass, as above  - if repeat CBC stable, recommend discharge home w/ follow-up as scheduled tomorrow with gyn-onc (Len) tomorrow  - precautions given regarding bleeding, symptoms of anemia, and when to return to the hospital    D/w attending Dr. Alice Phillips MD PGY2

## 2018-10-01 NOTE — ED PROVIDER NOTE - OBJECTIVE STATEMENT
PGY1/MD Alycia. 79 yo F T2DM not on insulin, rheumatoid arthritis, HTN, macular degeneration, who currently works as a organ player/hannon presents with vaginal bleeding, that she found this morning when she woke up. She has had abdominal discomfort x 3-4 wks, saw Ayla Mar, and intrapelvic mass was identified, but has never had bloody spot until today. She also found weak urinary flow that also occurred 3-4 wks ago. No fever, sob, chest pain, dizziness, light headness. Pt has an appointment with Dr. Harrington this Tuesday. She used aspirin but stopped 10 days ago. No anticoa

## 2018-10-01 NOTE — ED ADULT NURSE NOTE - NSIMPLEMENTINTERV_GEN_ALL_ED
Implemented All Universal Safety Interventions:  [place] to call system. Call bell, personal items and telephone within reach. Instruct patient to call for assistance. Room bathroom lighting operational. Non-slip footwear when patient is off stretcher. Physically safe environment: no spills, clutter or unnecessary equipment. Stretcher in lowest position, wheels locked, appropriate side rails in place.

## 2018-10-01 NOTE — CONSULT NOTE ADULT - SUBJECTIVE AND OBJECTIVE BOX
GYN Consult Note    HPI:  78yoF postmenopausal  presents with vaginal bleeding onset overnight. Pt also reports abdominal pain and weakened urinary stream. Pt reports she woke overnight and found that she had "bled through my nighty." She then went to the bathroom and passed "something that was the size of a lemon or baseball." She called 911 and was brought to the ED. Pt was seen by her gyn in the last month due to weakened urinary stream and pelvic pain. Imaging found a pelvic mass and she was referred to gyn-onc. She never had vaginal bleeding prior to today. She is scheduled to be seen by Dr. Harrington tomorrow. She reports some abdominal cramping over the past 10 days. Pt denies CP, SOB, weakness, dizziness, or any other concerns.    OB/GYN HISTORY:   G1: C/s  G2: SAb at 8wk    Last Menstrual Period: at 57yo    Name of GYN Physician: Ayla Rodriguez     PAST MEDICAL & SURGICAL HISTORY:  Osteoarthritis  Rheumatoid arthritis  Diabetes  HTN (hypertension)  H/O:   Bartholin cyst removal    REVIEW OF SYSTEMS  General: denies fevers, chills, tiredness  Skin/Breast: denies breast pain  Respiratory and Thorax: denies shortness of breath, denies cough  Cardiovascular: denies chest pain and denies palpitations  Gastrointestinal: reports abdominal pain denies nausea/ vomiting	  Genitourinary: reports vaginal bleeding, weakened urinary stream; denies dysuria  Constitutional, Cardiovascular, Respiratory, Gastrointestinal, Genitourinary, Musculoskeletal and Integumentary review of systems are normal except as noted. 	    Allergies  Benadryl (Unknown)  codeine (Rash)  patient states mushrooms make her &quot;purple&quot; (Unknown)  penicillin (Rash)  sulfa drugs (Rash (Mild to Mod))    SOCIAL HISTORY:  Lives alone    FAMILY HISTORY:  No pertinent family history in first degree relatives      Vital Signs Last 24 Hrs  T(C): 36.9 (01 Oct 2018 04:20), Max: 36.9 (01 Oct 2018 04:20)  T(F): 98.4 (01 Oct 2018 04:20), Max: 98.4 (01 Oct 2018 04:20)  HR: 102 (01 Oct 2018 07:00) (102 - 111)  BP: 161/63 (01 Oct 2018 07:00) (161/63 - 181/74)  BP(mean): --  RR: 17 (01 Oct 2018 07:00) (17 - 18)  SpO2: 97% (01 Oct 2018 07:00) (97% - 97%)    PHYSICAL EXAM:  Gen: NAD, alert and oriented x 3  Cardiovascular: regular rate and rhythm  Respiratory: breathing comfortably on RA  Abd: soft, non tender, non-distended  Pelvic: dark brown blood pooled on speculum exam, no active bleeding, no CMT  Adnexa: non tender, large left sided pelvic mass at the level of the umbilicus  Extremities: NTBL  Skin: warm and well perfused    LABS:                        11.1   7.4   )-----------( 436      ( 01 Oct 2018 05:07 )             35.3     10    135  |  100  |  17  ----------------------------<  166<H>  4.2   |  23  |  0.48<L>    Ca    9.0      01 Oct 2018 05:07    TPro  6.7  /  Alb  3.4  /  TBili  0.3  /  DBili  x   /  AST  15  /  ALT  10  /  AlkPhos  79  10    PT/INR - ( 01 Oct 2018 05:07 )   PT: 12.8 sec;   INR: 1.17 ratio         PTT - ( 01 Oct 2018 05:07 )  PTT:30.6 sec      RADIOLOGY & ADDITIONAL STUDIES:  < from: US Doppler Pelvis (10.01.18 @ 07:10) >  EXAM:  US PELVIC COMPLETE                          EXAM:  US DPLX PELVIC                            PROCEDURE DATE:  10/01/2018            INTERPRETATION:  CLINICAL INFORMATION: Vaginal bleeding.    LMP: N/A    COMPARISON: None available.    TECHNIQUE:     Transabdominal pelvic sonogram only. Color and Spectral Doppler was   performed.    The patient could not tolerate endovaginal sonographic scanning.    FINDINGS:    Uterus: Measures 5.5 x 7 x 2.2 cm.    Endometrium: 5 mm .    Right ovary: Measures 1.3 x 2.5 x 1.5 cm. Within normal limits.    Left ovary: The left ovary is not clearly visualized. In the region of   the left ovary there is a large heterogeneous, hypervascular mass   measuring 12.2 x 8.8 x 11.3 cm which displaces the uterus anteriorly.    Additionally, the cervix appears distended with fluid and ill-defined   soft tissue.    Fluid: None.    IMPRESSION:   12 cm left pelvic mass, likely originating from the left ovary.  Distended cervix with ill-defined soft tissue.  Limited visualization in the absence of endovaginal scanning. Recommend   cross-sectional imaging for further evaluation.                SELMA DIAZ D.O., RADIOLOGY RESIDENT  This document has been electronically signed.  YARED GRAFF M.D., ATTENDING RADIOLOGIST  This document has been electronically signed. Oct  1 2018  6:29AM    < end of copied text >    < from: CT Abdomen and Pelvis w/ IV Cont (10.01.18 @ 07:54) >  EXAM:  CT ABDOMEN AND PELVIS IC                            PROCEDURE DATE:  10/01/2018            INTERPRETATION:  CLINICAL INFORMATION: Ovarian cancer with vaginal   bleeding and intrapelvic mass seen 3 weeks ago.    COMPARISON: Pelvic ultrasound from earlier the same day.    PROCEDURE:   CT of the Abdomen and Pelvis was performed with intravenous contrast.   Intravenous contrast: 90 ml Omnipaque 350. 10 ml discarded.  Oral contrast: None.  Sagittal and coronal reformats were performed.    FINDINGS:    LOWER CHEST: Trace right greater than left pleural fluid with mild   adjacent atelectasis.    LIVER: Within normal limits.  BILE DUCTS: Normal caliber.  GALLBLADDER: Within normal limits.  SPLEEN: Within normal limits.  PANCREAS: Within normal limits.  ADRENALS: Slightly nodular left adrenal gland.  KIDNEYS/URETERS: Within normal limits.    BLADDER: Within normal limits.  REPRODUCTIVE ORGANS: There is heterogeneous material within the vaginal   cavity better characterized on the ultrasound from the same day. There is   a heterogeneously enhancing left adnexal mass which measures 16.4 x 12.0   cm (2, 75).    BOWEL: Rectum and sigmoid colon are pushed over to the right side of the   pelvis by the left adnexal mass. There is colonic diverticulosis. Small   hiatal hernia.  PERITONEUM: Small ascites. A tiny nodule in the left upper quadrant may   represent a splenule.  VESSELS:  Arterial calcifications are noted.  RETROPERITONEUM: No lymphadenopathy.    ABDOMINAL WALL: Within normal limits.  BONES: Very marked bilateral hip arthrosis.    IMPRESSION: 16.4 x 12.0 cm left adnexal mass consistent with the history   of ovarian cancer. Heterogeneous material within the vaginal cavity   better characterized on the ultrasound from the same day. Small ascites.                        YONI BARNES M.D., ATTENDING RADIOLOGIST  This document has been electronically signed. Oct  1 2018  8:10AM    < end of copied text >

## 2018-10-01 NOTE — ED PROVIDER NOTE - PROGRESS NOTE DETAILS
[place] PGY1- Discussion with GYN team. If repeat Hgb stable, vitals normal, and patient without dizziness/syncope, ok to d/c and f/u with gyn-onc. She has appt tomorrow.

## 2018-10-01 NOTE — ED PROVIDER NOTE - PHYSICAL EXAMINATION
PGY1/MD Alycia.   VITALS: reviewed  GEN: No apparent distress, A & O x 4  HEAD/EYES: NC/AT, anicteric sclerae, no conjunctival pallor  ENT: mucus membranes moist, oropharynx WNL, trachea midline, no JVD, neck is supple  RESP: lungs CTA with equal breath sounds bilaterally, chest wall nontender and atraumatic  CV: heart with reg rhythm S1, S2, no murmur; distal pulses intact and symmetric bilaterally  ABDOMEN: normoactive bowel sounds, soft, +distended lower abdomen, +LLQ tender,  MSK: extremities atraumatic and nontender, +edema b/l,   SKIN: warm, dry, no rash, no bruising, no cyanosis. color appropriate for ethnicity  NEURO: alert, mentating appropriately, no facial asymmetry.   PSYCH: Affect appropriate    Pelvic Exam: pooled dark color blood in the vagina, prolapsed tissue?

## 2018-10-01 NOTE — ED ADULT NURSE REASSESSMENT NOTE - NS ED NURSE REASSESS COMMENT FT1
pt states came by ambulance last night; has no clothes; pt given paper scrubs; pt states neighbor that would pick her up is not answering; pt states son on way home from [place]; will be at airport at noon; pt request to stay in hallway until can acquire ride home; iv d/c'd; pt assist with paper scrubs; menstruation pads given to pt

## 2018-10-01 NOTE — ED PROVIDER NOTE - NS ED ROS FT
PGY1/MD Alycia.   CONST: no fevers, no chills, no trauma  EYES: no pain, no visual disturbances  ENT: no sore throat, no epistaxis, no rhinorrhea, no hearing changes  CV: no chest pain, no palpitations, no orthopnea, no extremity pain or swelling  RESP: no shortness of breath, no cough, no sputum, no pleurisy, no wheezing  ABD: +abdominal pain, no nausea, no vomiting, no diarrhea, no black or bloody stool  : no dysuria, no hematuria, no frequency, no urgency  MSK: no back pain, no neck pain, no extremity pain  NEURO: no headache, no sensory disturbances, no focal weakness, no dizziness  SKIN: no diaphoresis, no rash

## 2018-10-02 ENCOUNTER — APPOINTMENT (OUTPATIENT)
Dept: GYNECOLOGIC ONCOLOGY | Facility: CLINIC | Age: 78
End: 2018-10-02
Payer: COMMERCIAL

## 2018-10-02 VITALS
BODY MASS INDEX: 19.63 KG/M2 | HEART RATE: 105 BPM | SYSTOLIC BLOOD PRESSURE: 147 MMHG | HEIGHT: 64 IN | DIASTOLIC BLOOD PRESSURE: 74 MMHG | WEIGHT: 115 LBS

## 2018-10-02 VITALS — BODY MASS INDEX: 18.1 KG/M2 | WEIGHT: 106 LBS | HEIGHT: 64 IN

## 2018-10-02 DIAGNOSIS — Z80.0 FAMILY HISTORY OF MALIGNANT NEOPLASM OF DIGESTIVE ORGANS: ICD-10-CM

## 2018-10-02 DIAGNOSIS — N95.0 POSTMENOPAUSAL BLEEDING: ICD-10-CM

## 2018-10-02 DIAGNOSIS — Z60.2 PROBLEMS RELATED TO LIVING ALONE: ICD-10-CM

## 2018-10-02 DIAGNOSIS — N88.8 OTHER SPECIFIED NONINFLAMMATORY DISORDERS OF CERVIX UTERI: ICD-10-CM

## 2018-10-02 DIAGNOSIS — Z00.00 ENCOUNTER FOR GENERAL ADULT MEDICAL EXAMINATION W/OUT ABNORMAL FINDINGS: ICD-10-CM

## 2018-10-02 PROCEDURE — 58100 BIOPSY OF UTERUS LINING: CPT

## 2018-10-02 PROCEDURE — 99205 OFFICE O/P NEW HI 60 MIN: CPT | Mod: 25

## 2018-10-02 SDOH — SOCIAL STABILITY - SOCIAL INSECURITY: PROBLEMS RELATED TO LIVING ALONE: Z60.2

## 2018-10-05 RX ORDER — AZITHROMYCIN 250 MG/1
250 TABLET, FILM COATED ORAL
Qty: 1 | Refills: 0 | Status: DISCONTINUED | OUTPATIENT
Start: 2018-07-24 | End: 2018-10-05

## 2018-10-09 ENCOUNTER — APPOINTMENT (OUTPATIENT)
Dept: CT IMAGING | Facility: IMAGING CENTER | Age: 78
End: 2018-10-09
Payer: COMMERCIAL

## 2018-10-09 ENCOUNTER — OUTPATIENT (OUTPATIENT)
Dept: OUTPATIENT SERVICES | Facility: HOSPITAL | Age: 78
LOS: 1 days | End: 2018-10-09
Payer: COMMERCIAL

## 2018-10-09 VITALS
WEIGHT: 113.98 LBS | HEART RATE: 94 BPM | DIASTOLIC BLOOD PRESSURE: 62 MMHG | OXYGEN SATURATION: 97 % | TEMPERATURE: 98 F | SYSTOLIC BLOOD PRESSURE: 122 MMHG | RESPIRATION RATE: 16 BRPM | HEIGHT: 63 IN

## 2018-10-09 DIAGNOSIS — R19.00 INTRA-ABDOMINAL AND PELVIC SWELLING, MASS AND LUMP, UNSPECIFIED SITE: ICD-10-CM

## 2018-10-09 DIAGNOSIS — Z98.891 HISTORY OF UTERINE SCAR FROM PREVIOUS SURGERY: Chronic | ICD-10-CM

## 2018-10-09 DIAGNOSIS — D36.9 BENIGN NEOPLASM, UNSPECIFIED SITE: Chronic | ICD-10-CM

## 2018-10-09 DIAGNOSIS — C44.91 BASAL CELL CARCINOMA OF SKIN, UNSPECIFIED: Chronic | ICD-10-CM

## 2018-10-09 DIAGNOSIS — H25.092 OTHER AGE-RELATED INCIPIENT CATARACT, LEFT EYE: ICD-10-CM

## 2018-10-09 DIAGNOSIS — N88.8 OTHER SPECIFIED NONINFLAMMATORY DISORDERS OF CERVIX UTERI: ICD-10-CM

## 2018-10-09 DIAGNOSIS — R10.9 UNSPECIFIED ABDOMINAL PAIN: ICD-10-CM

## 2018-10-09 DIAGNOSIS — E11.9 TYPE 2 DIABETES MELLITUS WITHOUT COMPLICATIONS: ICD-10-CM

## 2018-10-09 DIAGNOSIS — R14.0 ABDOMINAL DISTENSION (GASEOUS): ICD-10-CM

## 2018-10-09 DIAGNOSIS — I10 ESSENTIAL (PRIMARY) HYPERTENSION: ICD-10-CM

## 2018-10-09 DIAGNOSIS — L98.9 DISORDER OF THE SKIN AND SUBCUTANEOUS TISSUE, UNSPECIFIED: ICD-10-CM

## 2018-10-09 DIAGNOSIS — N75.0 CYST OF BARTHOLIN'S GLAND: Chronic | ICD-10-CM

## 2018-10-09 DIAGNOSIS — R79.9 ABNORMAL FINDING OF BLOOD CHEMISTRY, UNSPECIFIED: ICD-10-CM

## 2018-10-09 LAB
BLD GP AB SCN SERPL QL: NEGATIVE — SIGNIFICANT CHANGE UP
HBA1C BLD-MCNC: 6.4 % — HIGH (ref 4–5.6)
RH IG SCN BLD-IMP: POSITIVE — SIGNIFICANT CHANGE UP

## 2018-10-09 PROCEDURE — 71250 CT THORAX DX C-: CPT | Mod: 26

## 2018-10-09 PROCEDURE — 71250 CT THORAX DX C-: CPT

## 2018-10-09 PROCEDURE — 93010 ELECTROCARDIOGRAM REPORT: CPT

## 2018-10-09 NOTE — H&P PST ADULT - PROBLEM SELECTOR PLAN 4
Dr. Dean in to evaluate patient.  Dr. Dean states pt is to have LLE evaluated by PCP tonight or tomorrow morning.  Pt able to verbalize understanding.  Spoke with pt's PCP, PCP agrees to see patient tomorrow morning.  Dr. Harrington notified via email.

## 2018-10-09 NOTE — H&P PST ADULT - FAMILY HISTORY
Father  Still living? No  Family history of heart disease, Age at diagnosis: Age Unknown  Family history of cancer, Age at diagnosis: Age Unknown     Grandparent  Still living? No  Family history of cancer, Age at diagnosis: Age Unknown

## 2018-10-09 NOTE — H&P PST ADULT - NEGATIVE CARDIOVASCULAR SYMPTOMS
no dyspnea on exertion/no orthopnea/no chest pain/no peripheral edema/no claudication/no paroxysmal nocturnal dyspnea/no palpitations

## 2018-10-09 NOTE — H&P PST ADULT - SKIN COMMENTS
left shin erythema and edema, pt reports she had an injury 10 weeks ago, was under care of Dr. Matamoros, had hematoma evacuation 2x, last seen surgeon 4 weeks ago.  Pt reports erythema is improving.

## 2018-10-09 NOTE — H&P PST ADULT - MUSCULOSKELETAL
details… detailed exam no calf tenderness/decreased ROM/normal strength/joint swelling normal strength/no calf tenderness/decreased ROM/RA and OA/joint swelling no calf tenderness/decreased ROM/joint swelling/normal strength/RA and OA, joint deformities presents throughout

## 2018-10-09 NOTE — H&P PST ADULT - PRIMARY CARE PROVIDER
Dr. Alvarez([facility]) [phone] Dr. Alvarez([facility]) [phone]

## 2018-10-09 NOTE — H&P PST ADULT - PROBLEM SELECTOR PLAN 3
Pt instructed to take ramipril AM of surgery with a sip of water.   Pt met STOP BANG score for ЕКАТЕРИНА precaution. OR booking notified via fax.

## 2018-10-09 NOTE — H&P PST ADULT - PMH
Diabetes  type 2  HTN (hypertension)    Intra-abdominal and pelvic swelling, mass and lump, unspecified site    Osteoarthritis    Rheumatoid arthritis Diabetes  type 2  HTN (hypertension)    Intra-abdominal and pelvic swelling, mass and lump, unspecified site    Osteoarthritis    Rheumatoid arthritis    Skin abnormality

## 2018-10-09 NOTE — H&P PST ADULT - HISTORY OF PRESENT ILLNESS
79 yo female with PMH DM2, RA, hypertension, and macular degeneration presents to pre surgical testing. Pt reports she had abdominal pain 1 months ago, went to GYN, transvaginal sonogram was done, revealed pelvic mass.  Pt reports she started to experience vaginal bleeding 1 week ago, and is currently experiencing spotting.  Pt is scheduled for exploratory total abdominal hysterectomy, bilateral salpingo oophorectomy with resection of pelvic mass, possible staging on 10/22/18.

## 2018-10-09 NOTE — H&P PST ADULT - NEGATIVE ENMT SYMPTOMS
no nasal congestion/no sinus symptoms/no dysphagia/no tinnitus/no vertigo/no hearing difficulty/no ear pain

## 2018-10-09 NOTE — H&P PST ADULT - NSANTHOSAYNRD_GEN_A_CORE
No. ЕКАТЕРИНА screening performed.  STOP BANG Legend: 0-2 = LOW Risk; 3-4 = INTERMEDIATE Risk; 5-8 = HIGH Risk

## 2018-10-09 NOTE — H&P PST ADULT - GENITOURINARY COMMENTS
preop dx. intra-abdominal and pelvic swelling, mass and lump palpable left pelvic mass, mild tenderness on palpation

## 2018-10-09 NOTE — H&P PST ADULT - PROBLEM SELECTOR PLAN 1
Pt is scheduled for exploratory total abdominal hysterectomy, bilateral salpingo oophorectomy with resection of pelvic mass, possible staging on 10/22/18.   Pre op instructions including chlorhexidine wash given and pt able to verbalize understanding.   Pt to obtain medical eval as requested by surgeon, will request document.

## 2018-10-10 ENCOUNTER — APPOINTMENT (OUTPATIENT)
Dept: INTERNAL MEDICINE | Facility: CLINIC | Age: 78
End: 2018-10-10
Payer: COMMERCIAL

## 2018-10-10 VITALS
BODY MASS INDEX: 19.57 KG/M2 | SYSTOLIC BLOOD PRESSURE: 132 MMHG | OXYGEN SATURATION: 90 % | DIASTOLIC BLOOD PRESSURE: 70 MMHG | HEART RATE: 72 BPM | WEIGHT: 114 LBS

## 2018-10-10 DIAGNOSIS — Z87.891 PERSONAL HISTORY OF NICOTINE DEPENDENCE: ICD-10-CM

## 2018-10-10 DIAGNOSIS — Z01.818 ENCOUNTER FOR OTHER PREPROCEDURAL EXAMINATION: ICD-10-CM

## 2018-10-10 DIAGNOSIS — R19.00 INTRA-ABDOMINAL AND PELVIC SWELLING, MASS AND LUMP, UNSPECIFIED SITE: ICD-10-CM

## 2018-10-10 DIAGNOSIS — E11.9 TYPE 2 DIABETES MELLITUS W/OUT COMPLICATIONS: ICD-10-CM

## 2018-10-10 PROCEDURE — 99214 OFFICE O/P EST MOD 30 MIN: CPT

## 2018-10-11 ENCOUNTER — APPOINTMENT (OUTPATIENT)
Dept: INTERNAL MEDICINE | Facility: CLINIC | Age: 78
End: 2018-10-11

## 2018-10-11 ENCOUNTER — NON-APPOINTMENT (OUTPATIENT)
Age: 78
End: 2018-10-11

## 2018-10-11 ENCOUNTER — APPOINTMENT (OUTPATIENT)
Dept: CARDIOLOGY | Facility: CLINIC | Age: 78
End: 2018-10-11
Payer: COMMERCIAL

## 2018-10-11 ENCOUNTER — OUTPATIENT (OUTPATIENT)
Dept: OUTPATIENT SERVICES | Facility: HOSPITAL | Age: 78
LOS: 1 days | End: 2018-10-11
Payer: COMMERCIAL

## 2018-10-11 VITALS
HEART RATE: 79 BPM | BODY MASS INDEX: 19.46 KG/M2 | OXYGEN SATURATION: 97 % | HEIGHT: 64 IN | WEIGHT: 114 LBS | SYSTOLIC BLOOD PRESSURE: 140 MMHG | DIASTOLIC BLOOD PRESSURE: 53 MMHG

## 2018-10-11 DIAGNOSIS — I10 ESSENTIAL (PRIMARY) HYPERTENSION: ICD-10-CM

## 2018-10-11 DIAGNOSIS — C44.91 BASAL CELL CARCINOMA OF SKIN, UNSPECIFIED: Chronic | ICD-10-CM

## 2018-10-11 DIAGNOSIS — R60.9 EDEMA, UNSPECIFIED: ICD-10-CM

## 2018-10-11 DIAGNOSIS — Z98.891 HISTORY OF UTERINE SCAR FROM PREVIOUS SURGERY: Chronic | ICD-10-CM

## 2018-10-11 DIAGNOSIS — E11.9 TYPE 2 DIABETES MELLITUS WITHOUT COMPLICATIONS: ICD-10-CM

## 2018-10-11 DIAGNOSIS — N75.0 CYST OF BARTHOLIN'S GLAND: Chronic | ICD-10-CM

## 2018-10-11 PROBLEM — L98.9 DISORDER OF THE SKIN AND SUBCUTANEOUS TISSUE, UNSPECIFIED: Chronic | Status: ACTIVE | Noted: 2018-10-09

## 2018-10-11 PROBLEM — R19.00 INTRA-ABDOMINAL AND PELVIC SWELLING, MASS AND LUMP, UNSPECIFIED SITE: Chronic | Status: ACTIVE | Noted: 2018-10-09

## 2018-10-11 PROCEDURE — 99214 OFFICE O/P EST MOD 30 MIN: CPT

## 2018-10-11 PROCEDURE — 93306 TTE W/DOPPLER COMPLETE: CPT

## 2018-10-11 PROCEDURE — 93306 TTE W/DOPPLER COMPLETE: CPT | Mod: 26

## 2018-10-11 PROCEDURE — 93000 ELECTROCARDIOGRAM COMPLETE: CPT

## 2018-10-12 PROBLEM — R60.9 EDEMA, PERIPHERAL: Status: ACTIVE | Noted: 2018-10-10

## 2018-10-12 RX ORDER — METRONIDAZOLE 500 MG/1
500 TABLET ORAL
Qty: 3 | Refills: 0 | Status: ACTIVE | COMMUNITY
Start: 2018-10-11 | End: 1900-01-01

## 2018-10-12 RX ORDER — NEOMYCIN SULFATE 500 MG/1
500 TABLET ORAL
Qty: 6 | Refills: 0 | Status: ACTIVE | COMMUNITY
Start: 2018-10-11 | End: 1900-01-01

## 2018-10-15 ENCOUNTER — FORM ENCOUNTER (OUTPATIENT)
Age: 78
End: 2018-10-15

## 2018-10-15 PROBLEM — Z01.818 PRE-OPERATIVE EXAM: Status: ACTIVE | Noted: 2018-10-15

## 2018-10-15 PROBLEM — Z87.891 FORMER SMOKER: Status: ACTIVE | Noted: 2018-10-02

## 2018-10-15 PROBLEM — R19.00 PELVIC MASS IN FEMALE: Status: ACTIVE | Noted: 2018-10-02

## 2018-10-15 NOTE — ASSESSMENT
[Patient Optimized for Surgery] : Patient optimized for surgery [Modify medications prior to procedure] : Modify medications prior to procedure [As per surgery] : as per surgery [FreeTextEntry7] : will hold glipizide on day of procedure.

## 2018-10-15 NOTE — HISTORY OF PRESENT ILLNESS
[Aortic Stenosis] : no aortic stenosis [Atrial Fibrillation] : no atrial fibrillation [Coronary Artery Disease] : no coronary artery disease [Recent Myocardial Infarction] : no recent myocardial infarction [Implantable Device/Pacemaker] : no implantable device/pacemaker [Asthma] : no asthma [COPD] : no COPD [Sleep Apnea] : no sleep apnea [Smoker] : not a smoker [Chronic Anticoagulation] : no chronic anticoagulation [Chronic Kidney Disease] : no chronic kidney disease [Diabetes] : diabetes [Moderate (4-6 METs)] : Moderate (4-6 METs) [FreeTextEntry1] : ritika/bso [FreeTextEntry2] : 10/25/18 [FreeTextEntry3] : Dr. MAYE Harrington [FreeTextEntry4] : Pt has been found to have a pelvic mass -- ovarian v. uterine -- and is for resection within the next several weeks by Dr. Harrington.  she will be going for cardiology evaluation as well in advance of the procedure.  Despite her physical ailments, she has continued with good functional status. even when exerting self while carrying significant weight, she is able to perform activity without chest discomfort or shortness of breath. Her diabetes has been of very long standing but remains in good control.

## 2018-10-15 NOTE — PLAN
[FreeTextEntry1] : Pt is at intermediate clinical cardiovascular risk for the planned, intermediate-risk surgery.  Her functional status remains good.  There appear to be no medical contraindications to proceeding to intended surgery.  Have facilitated pt's undergoing new echocardiogram and cardiology evaluation prior to procedure.  have also requested given new r le swelling that pt go for le venous dopplers to r/o dvt prior to procedure. edema may more likely reflect impact upon venous/lymphatic drainage from mass at pelvis, but will r/o dvt as well.  l le healing well s/p hematoma drainage - no current signs infection.

## 2018-10-15 NOTE — PHYSICAL EXAM
[No Acute Distress] : no acute distress [Well Nourished] : well nourished [Well Developed] : well developed [Well-Appearing] : well-appearing [Normal Sclera/Conjunctiva] : normal sclera/conjunctiva [PERRL] : pupils equal round and reactive to light [EOMI] : extraocular movements intact [Normal Outer Ear/Nose] : the outer ears and nose were normal in appearance [Normal Oropharynx] : the oropharynx was normal [No JVD] : no jugular venous distention [Supple] : supple [No Lymphadenopathy] : no lymphadenopathy [Thyroid Normal, No Nodules] : the thyroid was normal and there were no nodules present [No Respiratory Distress] : no respiratory distress  [Clear to Auscultation] : lungs were clear to auscultation bilaterally [No Accessory Muscle Use] : no accessory muscle use [Normal Rate] : normal rate  [Regular Rhythm] : with a regular rhythm [Normal S1, S2] : normal S1 and S2 [No Murmur] : no murmur heard [No Carotid Bruits] : no carotid bruits [No Abdominal Bruit] : a ~M bruit was not heard ~T in the abdomen [No Varicosities] : no varicosities [Pedal Pulses Present] : the pedal pulses are present [No Edema] : there was no peripheral edema [No Extremity Clubbing/Cyanosis] : no extremity clubbing/cyanosis [No Palpable Aorta] : no palpable aorta [Soft] : abdomen soft [Non Tender] : non-tender [Non-distended] : non-distended [No Masses] : no abdominal mass palpated [No HSM] : no HSM [Normal Bowel Sounds] : normal bowel sounds [Normal Posterior Cervical Nodes] : no posterior cervical lymphadenopathy [Normal Anterior Cervical Nodes] : no anterior cervical lymphadenopathy [No CVA Tenderness] : no CVA  tenderness [No Spinal Tenderness] : no spinal tenderness [Grossly Normal Strength/Tone] : grossly normal strength/tone [No Rash] : no rash [Normal Gait] : normal gait [Coordination Grossly Intact] : coordination grossly intact [No Focal Deficits] : no focal deficits [Deep Tendon Reflexes (DTR)] : deep tendon reflexes were 2+ and symmetric [Normal Affect] : the affect was normal [Normal Insight/Judgement] : insight and judgment were intact [Comprehensive Foot Exam Normal] : Right and left foot were examined and both feet are normal. No ulcers in either foot. Toes are normal and with full ROM.  Normal tactile sensation with monofilament testing throughout both feet [de-identified] : l anterolateral foreleg with residual erythema s/p resolution of large hematoma - approx 3 cm x 3 cm.  no abnl warmth at areas erythema. dp's 1+ b u nchanged. r foreleg with 1+ edema ; no cords, no abnl warmth, no erythema [de-identified] : fingers with nodularity, rheumatic

## 2018-10-16 ENCOUNTER — OUTPATIENT (OUTPATIENT)
Dept: OUTPATIENT SERVICES | Facility: HOSPITAL | Age: 78
LOS: 1 days | End: 2018-10-16
Payer: COMMERCIAL

## 2018-10-16 ENCOUNTER — APPOINTMENT (OUTPATIENT)
Dept: CT IMAGING | Facility: CLINIC | Age: 78
End: 2018-10-16
Payer: COMMERCIAL

## 2018-10-16 ENCOUNTER — APPOINTMENT (OUTPATIENT)
Dept: ULTRASOUND IMAGING | Facility: CLINIC | Age: 78
End: 2018-10-16
Payer: COMMERCIAL

## 2018-10-16 DIAGNOSIS — C44.91 BASAL CELL CARCINOMA OF SKIN, UNSPECIFIED: Chronic | ICD-10-CM

## 2018-10-16 DIAGNOSIS — R60.9 EDEMA, UNSPECIFIED: ICD-10-CM

## 2018-10-16 DIAGNOSIS — N75.0 CYST OF BARTHOLIN'S GLAND: Chronic | ICD-10-CM

## 2018-10-16 DIAGNOSIS — Z98.891 HISTORY OF UTERINE SCAR FROM PREVIOUS SURGERY: Chronic | ICD-10-CM

## 2018-10-16 LAB
CANCER AG125 SERPL-ACNC: 551 U/ML
CANCER AG19-9 SERPL-ACNC: 19.7 U/ML
CEA SERPL-MCNC: 1.7 NG/ML
CORE LAB BIOPSY: NORMAL

## 2018-10-16 PROCEDURE — 93970 EXTREMITY STUDY: CPT | Mod: 26

## 2018-10-16 PROCEDURE — 93970 EXTREMITY STUDY: CPT

## 2018-10-19 NOTE — ASU PATIENT PROFILE, ADULT - PMH
Diabetes  type 2  HTN (hypertension)    Intra-abdominal and pelvic swelling, mass and lump, unspecified site    Osteoarthritis    Rheumatoid arthritis    Skin abnormality

## 2018-10-21 ENCOUNTER — TRANSCRIPTION ENCOUNTER (OUTPATIENT)
Age: 78
End: 2018-10-21

## 2018-10-22 ENCOUNTER — INPATIENT (INPATIENT)
Facility: HOSPITAL | Age: 78
LOS: 10 days | Discharge: HOME | End: 2018-11-02
Attending: OBSTETRICS & GYNECOLOGY | Admitting: OBSTETRICS & GYNECOLOGY
Payer: COMMERCIAL

## 2018-10-22 ENCOUNTER — RESULT REVIEW (OUTPATIENT)
Age: 78
End: 2018-10-22

## 2018-10-22 ENCOUNTER — APPOINTMENT (OUTPATIENT)
Dept: GYNECOLOGIC ONCOLOGY | Facility: HOSPITAL | Age: 78
End: 2018-10-22

## 2018-10-22 VITALS
OXYGEN SATURATION: 96 % | HEART RATE: 96 BPM | WEIGHT: 113.98 LBS | RESPIRATION RATE: 14 BRPM | DIASTOLIC BLOOD PRESSURE: 65 MMHG | SYSTOLIC BLOOD PRESSURE: 159 MMHG | TEMPERATURE: 98 F | HEIGHT: 63 IN

## 2018-10-22 DIAGNOSIS — C44.91 BASAL CELL CARCINOMA OF SKIN, UNSPECIFIED: Chronic | ICD-10-CM

## 2018-10-22 DIAGNOSIS — N75.0 CYST OF BARTHOLIN'S GLAND: Chronic | ICD-10-CM

## 2018-10-22 DIAGNOSIS — R19.00 INTRA-ABDOMINAL AND PELVIC SWELLING, MASS AND LUMP, UNSPECIFIED SITE: ICD-10-CM

## 2018-10-22 DIAGNOSIS — Z98.891 HISTORY OF UTERINE SCAR FROM PREVIOUS SURGERY: Chronic | ICD-10-CM

## 2018-10-22 LAB
BASOPHILS # BLD AUTO: 0.02 K/UL — SIGNIFICANT CHANGE UP (ref 0–0.2)
BASOPHILS NFR BLD AUTO: 0.1 % — SIGNIFICANT CHANGE UP (ref 0–2)
EOSINOPHIL # BLD AUTO: 0 K/UL — SIGNIFICANT CHANGE UP (ref 0–0.5)
EOSINOPHIL NFR BLD AUTO: 0 % — SIGNIFICANT CHANGE UP (ref 0–6)
GLUCOSE BLDC GLUCOMTR-MCNC: 174 MG/DL — HIGH (ref 70–99)
GLUCOSE BLDC GLUCOMTR-MCNC: 91 MG/DL — SIGNIFICANT CHANGE UP (ref 70–99)
HCT VFR BLD CALC: 28.4 % — LOW (ref 34.5–45)
HGB BLD-MCNC: 8.5 G/DL — LOW (ref 11.5–15.5)
IMM GRANULOCYTES # BLD AUTO: 0.05 # — SIGNIFICANT CHANGE UP
IMM GRANULOCYTES NFR BLD AUTO: 0.3 % — SIGNIFICANT CHANGE UP (ref 0–1.5)
LYMPHOCYTES # BLD AUTO: 0.34 K/UL — LOW (ref 1–3.3)
LYMPHOCYTES # BLD AUTO: 2.1 % — LOW (ref 13–44)
MCHC RBC-ENTMCNC: 19.3 PG — LOW (ref 27–34)
MCHC RBC-ENTMCNC: 29.9 % — LOW (ref 32–36)
MCV RBC AUTO: 64.5 FL — LOW (ref 80–100)
MONOCYTES # BLD AUTO: 0.64 K/UL — SIGNIFICANT CHANGE UP (ref 0–0.9)
MONOCYTES NFR BLD AUTO: 3.9 % — SIGNIFICANT CHANGE UP (ref 2–14)
NEUTROPHILS # BLD AUTO: 15.31 K/UL — HIGH (ref 1.8–7.4)
NEUTROPHILS NFR BLD AUTO: 93.6 % — HIGH (ref 43–77)
NRBC # FLD: 0 — SIGNIFICANT CHANGE UP
PLATELET # BLD AUTO: 532 K/UL — HIGH (ref 150–400)
PMV BLD: 9 FL — SIGNIFICANT CHANGE UP (ref 7–13)
RBC # BLD: 4.4 M/UL — SIGNIFICANT CHANGE UP (ref 3.8–5.2)
RBC # FLD: 16.2 % — HIGH (ref 10.3–14.5)
RH IG SCN BLD-IMP: POSITIVE — SIGNIFICANT CHANGE UP
WBC # BLD: 16.36 K/UL — HIGH (ref 3.8–10.5)
WBC # FLD AUTO: 16.36 K/UL — HIGH (ref 3.8–10.5)

## 2018-10-22 PROCEDURE — 88307 TISSUE EXAM BY PATHOLOGIST: CPT | Mod: 26

## 2018-10-22 PROCEDURE — 88112 CYTOPATH CELL ENHANCE TECH: CPT | Mod: 26

## 2018-10-22 PROCEDURE — 88305 TISSUE EXAM BY PATHOLOGIST: CPT | Mod: 26

## 2018-10-22 PROCEDURE — 88331 PATH CONSLTJ SURG 1 BLK 1SPC: CPT | Mod: 26

## 2018-10-22 PROCEDURE — 58951 RESECT OVARIAN MALIGNANCY: CPT | Mod: GC

## 2018-10-22 PROCEDURE — 88305 TISSUE EXAM BY PATHOLOGIST: CPT | Mod: 26,59

## 2018-10-22 RX ORDER — METOPROLOL TARTRATE 50 MG
5 TABLET ORAL EVERY 6 HOURS
Qty: 0 | Refills: 0 | Status: DISCONTINUED | OUTPATIENT
Start: 2018-10-22 | End: 2018-10-24

## 2018-10-22 RX ORDER — DEXTROSE 50 % IN WATER 50 %
12.5 SYRINGE (ML) INTRAVENOUS ONCE
Qty: 0 | Refills: 0 | Status: DISCONTINUED | OUTPATIENT
Start: 2018-10-22 | End: 2018-10-24

## 2018-10-22 RX ORDER — METOCLOPRAMIDE HCL 10 MG
10 TABLET ORAL ONCE
Qty: 0 | Refills: 0 | Status: DISCONTINUED | OUTPATIENT
Start: 2018-10-22 | End: 2018-10-22

## 2018-10-22 RX ORDER — HEPARIN SODIUM 5000 [USP'U]/ML
5000 INJECTION INTRAVENOUS; SUBCUTANEOUS EVERY 8 HOURS
Qty: 0 | Refills: 0 | Status: DISCONTINUED | OUTPATIENT
Start: 2018-10-22 | End: 2018-10-23

## 2018-10-22 RX ORDER — GLUCAGON INJECTION, SOLUTION 0.5 MG/.1ML
1 INJECTION, SOLUTION SUBCUTANEOUS ONCE
Qty: 0 | Refills: 0 | Status: DISCONTINUED | OUTPATIENT
Start: 2018-10-22 | End: 2018-10-24

## 2018-10-22 RX ORDER — ONDANSETRON 8 MG/1
4 TABLET, FILM COATED ORAL EVERY 6 HOURS
Qty: 0 | Refills: 0 | Status: DISCONTINUED | OUTPATIENT
Start: 2018-10-22 | End: 2018-10-23

## 2018-10-22 RX ORDER — INSULIN LISPRO 100/ML
VIAL (ML) SUBCUTANEOUS
Qty: 0 | Refills: 0 | Status: DISCONTINUED | OUTPATIENT
Start: 2018-10-22 | End: 2018-10-24

## 2018-10-22 RX ORDER — HYDROMORPHONE HYDROCHLORIDE 2 MG/ML
0.5 INJECTION INTRAMUSCULAR; INTRAVENOUS; SUBCUTANEOUS
Qty: 0 | Refills: 0 | Status: DISCONTINUED | OUTPATIENT
Start: 2018-10-22 | End: 2018-10-22

## 2018-10-22 RX ORDER — RAMIPRIL 5 MG
1 CAPSULE ORAL
Qty: 0 | Refills: 0 | COMMUNITY

## 2018-10-22 RX ORDER — ENOXAPARIN SODIUM 100 MG/ML
40 INJECTION SUBCUTANEOUS DAILY
Qty: 0 | Refills: 0 | Status: DISCONTINUED | OUTPATIENT
Start: 2018-10-23 | End: 2018-10-24

## 2018-10-22 RX ORDER — DEXTROSE 50 % IN WATER 50 %
25 SYRINGE (ML) INTRAVENOUS ONCE
Qty: 0 | Refills: 0 | Status: DISCONTINUED | OUTPATIENT
Start: 2018-10-22 | End: 2018-11-02

## 2018-10-22 RX ORDER — DEXTROSE 50 % IN WATER 50 %
25 SYRINGE (ML) INTRAVENOUS ONCE
Qty: 0 | Refills: 0 | Status: DISCONTINUED | OUTPATIENT
Start: 2018-10-22 | End: 2018-10-24

## 2018-10-22 RX ORDER — SODIUM CHLORIDE 9 MG/ML
1000 INJECTION, SOLUTION INTRAVENOUS
Qty: 0 | Refills: 0 | Status: DISCONTINUED | OUTPATIENT
Start: 2018-10-22 | End: 2018-10-23

## 2018-10-22 RX ORDER — HYDROMORPHONE HYDROCHLORIDE 2 MG/ML
30 INJECTION INTRAMUSCULAR; INTRAVENOUS; SUBCUTANEOUS
Qty: 0 | Refills: 0 | Status: DISCONTINUED | OUTPATIENT
Start: 2018-10-22 | End: 2018-10-23

## 2018-10-22 RX ORDER — HYDROMORPHONE HYDROCHLORIDE 2 MG/ML
0.5 INJECTION INTRAMUSCULAR; INTRAVENOUS; SUBCUTANEOUS
Qty: 0 | Refills: 0 | Status: DISCONTINUED | OUTPATIENT
Start: 2018-10-22 | End: 2018-10-23

## 2018-10-22 RX ORDER — SODIUM CHLORIDE 9 MG/ML
1000 INJECTION, SOLUTION INTRAVENOUS
Qty: 0 | Refills: 0 | Status: DISCONTINUED | OUTPATIENT
Start: 2018-10-22 | End: 2018-10-22

## 2018-10-22 RX ORDER — HEPARIN SODIUM 5000 [USP'U]/ML
5000 INJECTION INTRAVENOUS; SUBCUTANEOUS ONCE
Qty: 0 | Refills: 0 | Status: COMPLETED | OUTPATIENT
Start: 2018-10-22 | End: 2018-10-22

## 2018-10-22 RX ORDER — DEXTROSE 50 % IN WATER 50 %
15 SYRINGE (ML) INTRAVENOUS ONCE
Qty: 0 | Refills: 0 | Status: DISCONTINUED | OUTPATIENT
Start: 2018-10-22 | End: 2018-10-24

## 2018-10-22 RX ORDER — SODIUM CHLORIDE 9 MG/ML
1000 INJECTION, SOLUTION INTRAVENOUS
Qty: 0 | Refills: 0 | Status: DISCONTINUED | OUTPATIENT
Start: 2018-10-22 | End: 2018-10-24

## 2018-10-22 RX ORDER — NALOXONE HYDROCHLORIDE 4 MG/.1ML
0.1 SPRAY NASAL
Qty: 0 | Refills: 0 | Status: DISCONTINUED | OUTPATIENT
Start: 2018-10-22 | End: 2018-10-23

## 2018-10-22 RX ORDER — ONDANSETRON 8 MG/1
4 TABLET, FILM COATED ORAL ONCE
Qty: 0 | Refills: 0 | Status: DISCONTINUED | OUTPATIENT
Start: 2018-10-22 | End: 2018-10-22

## 2018-10-22 RX ORDER — ACETAMINOPHEN 500 MG
1000 TABLET ORAL ONCE
Qty: 0 | Refills: 0 | Status: DISCONTINUED | OUTPATIENT
Start: 2018-10-22 | End: 2018-10-24

## 2018-10-22 RX ADMIN — HYDROMORPHONE HYDROCHLORIDE 30 MILLILITER(S): 2 INJECTION INTRAMUSCULAR; INTRAVENOUS; SUBCUTANEOUS at 22:36

## 2018-10-22 RX ADMIN — HYDROMORPHONE HYDROCHLORIDE 0.5 MILLIGRAM(S): 2 INJECTION INTRAMUSCULAR; INTRAVENOUS; SUBCUTANEOUS at 18:37

## 2018-10-22 RX ADMIN — HYDROMORPHONE HYDROCHLORIDE 0.5 MILLIGRAM(S): 2 INJECTION INTRAMUSCULAR; INTRAVENOUS; SUBCUTANEOUS at 17:57

## 2018-10-22 RX ADMIN — HYDROMORPHONE HYDROCHLORIDE 30 MILLILITER(S): 2 INJECTION INTRAMUSCULAR; INTRAVENOUS; SUBCUTANEOUS at 19:10

## 2018-10-22 RX ADMIN — HYDROMORPHONE HYDROCHLORIDE 0.5 MILLIGRAM(S): 2 INJECTION INTRAMUSCULAR; INTRAVENOUS; SUBCUTANEOUS at 18:19

## 2018-10-22 RX ADMIN — HEPARIN SODIUM 5000 UNIT(S): 5000 INJECTION INTRAVENOUS; SUBCUTANEOUS at 21:00

## 2018-10-22 RX ADMIN — HEPARIN SODIUM 5000 UNIT(S): 5000 INJECTION INTRAVENOUS; SUBCUTANEOUS at 10:24

## 2018-10-22 RX ADMIN — SODIUM CHLORIDE 30 MILLILITER(S): 9 INJECTION, SOLUTION INTRAVENOUS at 10:27

## 2018-10-22 RX ADMIN — HYDROMORPHONE HYDROCHLORIDE 0.5 MILLIGRAM(S): 2 INJECTION INTRAMUSCULAR; INTRAVENOUS; SUBCUTANEOUS at 18:52

## 2018-10-22 RX ADMIN — SODIUM CHLORIDE 90 MILLILITER(S): 9 INJECTION, SOLUTION INTRAVENOUS at 22:36

## 2018-10-22 RX ADMIN — HYDROMORPHONE HYDROCHLORIDE 0.5 MILLIGRAM(S): 2 INJECTION INTRAMUSCULAR; INTRAVENOUS; SUBCUTANEOUS at 18:15

## 2018-10-22 NOTE — BRIEF OPERATIVE NOTE - OPERATION/FINDINGS
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 Ascites present. Normal appearing uterus and fallopian tubes. Normal appearing liver and diaphragm. Normal appearing appendix.

## 2018-10-22 NOTE — PROGRESS NOTE ADULT - SUBJECTIVE AND OBJECTIVE BOX
PA GYN/ONC POST OP NOTE:    Pt awake and alert and resting comfortably and without any complaints. Pt has PCA and pain control is adequate and denies having nausea/vomiting, tolerating sips of water.    Vital Signs Last 24 Hrs  T(C): 36.5 (22 Oct 2018 21:15), Max: 36.6 (22 Oct 2018 09:31)  T(F): 97.7 (22 Oct 2018 21:15), Max: 97.9 (22 Oct 2018 09:31)  HR: 96 (22 Oct 2018 21:45) (80 - 99)  BP: 127/40 (22 Oct 2018 21:45) (106/44 - 159/65)  BP(mean): --  RR: 15 (22 Oct 2018 21:45) (12 - 28)  SpO2: 100% (22 Oct 2018 21:45) (96% - 100%)    U/O:    I&O's Detail    22 Oct 2018 07:01  -  22 Oct 2018 22:36  --------------------------------------------------------  IN:    lactated ringers.: 390 mL    Oral Fluid: 120 mL  Total IN: 510 mL    OUT:    Indwelling Catheter - Urethral: 295 mL  Total OUT: 295 mL    Total NET: 215 mL    PHYSICAL EXAM:  CHEST/LUNG: CTA B/L  HEART: S1S2 RRR  ABDOMEN: Soft, appropriate tenderness  INCISION: Dressing C/D/I  EXTREMITIES: NT B/L, Pt has Venodynes on for DVT ppx    LABS:                        8.5    16.36 )-----------( 532      ( 22 Oct 2018 20:25 )             28.4      MEDICATIONS  (STANDING):  dextrose 5%. 1000 milliLiter(s) (50 mL/Hr) IV Continuous <Continuous>  dextrose 50% Injectable 12.5 Gram(s) IV Push once  dextrose 50% Injectable 25 Gram(s) IV Push once  dextrose 50% Injectable 25 Gram(s) IV Push once  heparin  Injectable 5000 Unit(s) SubCutaneous every 8 hours  HYDROmorphone PCA (1 mG/mL) 30 milliLiter(s) PCA Continuous PCA Continuous  insulin lispro (HumaLOG) corrective regimen sliding scale   SubCutaneous three times a day before meals  lactated ringers. 1000 milliLiter(s) (90 mL/Hr) IV Continuous <Continuous>  metoprolol tartrate Injectable 5 milliGRAM(s) IV Push every 6 hours    MEDICATIONS  (PRN):  acetaminophen  IVPB .. 1000 milliGRAM(s) IV Intermittent once PRN Moderate Pain (4 - 6)  dextrose 40% Gel 15 Gram(s) Oral once PRN Blood Glucose LESS THAN 70 milliGRAM(s)/deciliter  glucagon  Injectable 1 milliGRAM(s) IntraMuscular once PRN Glucose LESS THAN 70 milligrams/deciliter  HYDROmorphone PCA (1 mG/mL) Rescue Clinician Bolus 0.5 milliGRAM(s) IV Push every 15 minutes PRN for Pain Scale GREATER THAN 6  naloxone Injectable 0.1 milliGRAM(s) IV Push every 3 minutes PRN For ANY of the following changes in patient status:  A. RR LESS THAN 10 breaths per minute, B. Oxygen saturation LESS THAN 90%, C. Sedation score of 6  ondansetron Injectable 4 milliGRAM(s) IV Push every 6 hours PRN Nausea PA GYN/ONC POST OP NOTE:    Pt awake and alert and resting comfortably and without any complaints. Pt has PCA and pain control is adequate and denies having nausea/vomiting, tolerating sips of water.    Vital Signs Last 24 Hrs  T(C): 36.5 (22 Oct 2018 21:15), Max: 36.6 (22 Oct 2018 09:31)  T(F): 97.7 (22 Oct 2018 21:15), Max: 97.9 (22 Oct 2018 09:31)  HR: 96 (22 Oct 2018 21:45) (80 - 99)  BP: 127/40 (22 Oct 2018 21:45) (106/44 - 159/65)  BP(mean): --  RR: 15 (22 Oct 2018 21:45) (12 - 28)  SpO2: 100% (22 Oct 2018 21:45) (96% - 100%)    U/O:    I&O's Detail    22 Oct 2018 07:01  -  22 Oct 2018 22:36  --------------------------------------------------------  IN:    lactated ringers.: 390 mL    Oral Fluid: 120 mL  Total IN: 510 mL    OUT:    Indwelling Catheter - Urethral: 295 mL  Total OUT: 295 mL    Total NET: 215 mL    PHYSICAL EXAM:  CHEST/LUNG: CTA B/L  HEART: S1S2 RRR  ABDOMEN: Soft, appropriate tenderness  INCISION: Dressing C/D/I  EXTREMITIES: NT B/L, Pt has Venodynes on for DVT ppx    LABS:                        8.5    16.36 )-----------( 532      ( 22 Oct 2018 20:25 )             28.4    MEDICATIONS  (STANDING):  dextrose 5%. 1000 milliLiter(s) (50 mL/Hr) IV Continuous <Continuous>  dextrose 50% Injectable 12.5 Gram(s) IV Push once  dextrose 50% Injectable 25 Gram(s) IV Push once  dextrose 50% Injectable 25 Gram(s) IV Push once  heparin  Injectable 5000 Unit(s) SubCutaneous every 8 hours  HYDROmorphone PCA (1 mG/mL) 30 milliLiter(s) PCA Continuous PCA Continuous  insulin lispro (HumaLOG) corrective regimen sliding scale   SubCutaneous three times a day before meals  lactated ringers. 1000 milliLiter(s) (90 mL/Hr) IV Continuous <Continuous>  metoprolol tartrate Injectable 5 milliGRAM(s) IV Push every 6 hours    MEDICATIONS  (PRN):  acetaminophen  IVPB .. 1000 milliGRAM(s) IV Intermittent once PRN Moderate Pain (4 - 6)  dextrose 40% Gel 15 Gram(s) Oral once PRN Blood Glucose LESS THAN 70 milliGRAM(s)/deciliter  glucagon  Injectable 1 milliGRAM(s) IntraMuscular once PRN Glucose LESS THAN 70 milligrams/deciliter  HYDROmorphone PCA (1 mG/mL) Rescue Clinician Bolus 0.5 milliGRAM(s) IV Push every 15 minutes PRN for Pain Scale GREATER THAN 6  naloxone Injectable 0.1 milliGRAM(s) IV Push every 3 minutes PRN For ANY of the following changes in patient status:  A. RR LESS THAN 10 breaths per minute, B. Oxygen saturation LESS THAN 90%, C. Sedation score of 6  ondansetron Injectable 4 milliGRAM(s) IV Push every 6 hours PRN Nausea

## 2018-10-22 NOTE — BRIEF OPERATIVE NOTE - PROCEDURE
<<-----Click on this checkbox to enter Procedure Omentectomy  10/22/2018    Active  RSHIBATA  Exploratory laparotomy  10/22/2018    Active  RSHIBATA  Total abdominal hysterectomy and bilateral salpingo-oophorectomy  10/22/2018    Active  RSHIBATA  Resection of left pelvic mass  10/22/2018    Active  RSHIBATA  Pelvic lymph node dissection  10/22/2018    Active  RSHIBATA  Dissection of para-aortic lymph nodes  10/22/2018    Active  RSHIBATA  Peritoneal biopsy  10/22/2018    Active  RSHIBATA

## 2018-10-22 NOTE — PROGRESS NOTE ADULT - ASSESSMENT
79 Y/O S/P XLap,FEDERICO, BSO, Resection of Pelvic Mass, PPALND, Peritoneal Bx  Plan  1. Patient encouraged to use Incentive Spirometer  2. Pain management; PCA   3. ADA clear liquids diet  4. IVFluids LR @ 90mL/hr  5. Heparin 5000 units subcut Q 8 hours  6. FS Before meals & bedtime with ISS coverage  7. Lopressor 5 mg ! Q 6 hours  8. CBC, BMP, Mg & Phos in AM POD#1 77 Y/O S/P XLap,FEDERICO, BSO, Resection of Pelvic Mass, PPALND, Peritoneal Bx  Plan  1. Patient encouraged to use Incentive Spirometer  2. Pain management; PCA & Ofirmev x1dose PRN  3. ADA clear liquids diet  4. IVFluids LR @ 90mL/hr  5. Heparin 5000 units subcut Q 8 hours & change to Lovenox 40mg subcut daily starting tomorrow  6. FS Before meals & bedtime with ISS coverage  7. Lopressor 5 mg ! Q 6 hours  8. CBC, BMP, Mg & Phos in AM POD#1 79 Y/O S/P XLap,FEDERICO, BSO, Resection of Pelvic Mass, PPALND, Peritoneal Bx  Plan  1. Patient encouraged to use Incentive Spirometer  2. Pain management; PCA & Ofirmev x1dose PRN  3. ADA clear liquids diet  4. IVFluids LR @ 90mL/hr  5. Heparin 5000 units subcut Q 8 hours & change to Lovenox 40mg subcut daily starting tomorrow  6. FS Before meals & bedtime with ISS coverage  7. Lopressor 5 mg IVP Q 6 hours  8. Priest to gravity  9. CBC, BMP, Mg & Phos in AM POD#1

## 2018-10-23 DIAGNOSIS — Z98.890 OTHER SPECIFIED POSTPROCEDURAL STATES: ICD-10-CM

## 2018-10-23 LAB
BASOPHILS # BLD AUTO: 0.02 K/UL — SIGNIFICANT CHANGE UP (ref 0–0.2)
BASOPHILS NFR BLD AUTO: 0.1 % — SIGNIFICANT CHANGE UP (ref 0–2)
BUN SERPL-MCNC: 17 MG/DL — SIGNIFICANT CHANGE UP (ref 7–23)
CALCIUM SERPL-MCNC: 7.6 MG/DL — LOW (ref 8.4–10.5)
CHLORIDE SERPL-SCNC: 96 MMOL/L — LOW (ref 98–107)
CO2 SERPL-SCNC: 16 MMOL/L — LOW (ref 22–31)
CREAT SERPL-MCNC: 0.55 MG/DL — SIGNIFICANT CHANGE UP (ref 0.5–1.3)
EOSINOPHIL # BLD AUTO: 0 K/UL — SIGNIFICANT CHANGE UP (ref 0–0.5)
EOSINOPHIL NFR BLD AUTO: 0 % — SIGNIFICANT CHANGE UP (ref 0–6)
GLUCOSE BLDC GLUCOMTR-MCNC: 178 MG/DL — HIGH (ref 70–99)
GLUCOSE BLDC GLUCOMTR-MCNC: 195 MG/DL — HIGH (ref 70–99)
GLUCOSE BLDC GLUCOMTR-MCNC: 264 MG/DL — HIGH (ref 70–99)
GLUCOSE BLDC GLUCOMTR-MCNC: 292 MG/DL — HIGH (ref 70–99)
GLUCOSE SERPL-MCNC: 208 MG/DL — HIGH (ref 70–99)
HCT VFR BLD CALC: 28.7 % — LOW (ref 34.5–45)
HGB BLD-MCNC: 8.6 G/DL — LOW (ref 11.5–15.5)
IMM GRANULOCYTES # BLD AUTO: 0.06 # — SIGNIFICANT CHANGE UP
IMM GRANULOCYTES NFR BLD AUTO: 0.4 % — SIGNIFICANT CHANGE UP (ref 0–1.5)
LYMPHOCYTES # BLD AUTO: 0.76 K/UL — LOW (ref 1–3.3)
LYMPHOCYTES # BLD AUTO: 5.3 % — LOW (ref 13–44)
MAGNESIUM SERPL-MCNC: 1.8 MG/DL — SIGNIFICANT CHANGE UP (ref 1.6–2.6)
MCHC RBC-ENTMCNC: 19.5 PG — LOW (ref 27–34)
MCHC RBC-ENTMCNC: 30 % — LOW (ref 32–36)
MCV RBC AUTO: 64.9 FL — LOW (ref 80–100)
MONOCYTES # BLD AUTO: 1.01 K/UL — HIGH (ref 0–0.9)
MONOCYTES NFR BLD AUTO: 7.1 % — SIGNIFICANT CHANGE UP (ref 2–14)
NEUTROPHILS # BLD AUTO: 12.43 K/UL — HIGH (ref 1.8–7.4)
NEUTROPHILS NFR BLD AUTO: 87.1 % — HIGH (ref 43–77)
NON-GYNECOLOGICAL CYTOLOGY STUDY: SIGNIFICANT CHANGE UP
NRBC # FLD: 0 — SIGNIFICANT CHANGE UP
PHOSPHATE SERPL-MCNC: 3.7 MG/DL — SIGNIFICANT CHANGE UP (ref 2.5–4.5)
PLATELET # BLD AUTO: 622 K/UL — HIGH (ref 150–400)
PMV BLD: 9.3 FL — SIGNIFICANT CHANGE UP (ref 7–13)
POTASSIUM SERPL-MCNC: 4.4 MMOL/L — SIGNIFICANT CHANGE UP (ref 3.5–5.3)
POTASSIUM SERPL-SCNC: 4.4 MMOL/L — SIGNIFICANT CHANGE UP (ref 3.5–5.3)
RBC # BLD: 4.42 M/UL — SIGNIFICANT CHANGE UP (ref 3.8–5.2)
RBC # FLD: 16.2 % — HIGH (ref 10.3–14.5)
SODIUM SERPL-SCNC: 133 MMOL/L — LOW (ref 135–145)
WBC # BLD: 14.28 K/UL — HIGH (ref 3.8–10.5)
WBC # FLD AUTO: 14.28 K/UL — HIGH (ref 3.8–10.5)

## 2018-10-23 RX ORDER — OXYCODONE HYDROCHLORIDE 5 MG/1
10 TABLET ORAL EVERY 6 HOURS
Qty: 0 | Refills: 0 | Status: DISCONTINUED | OUTPATIENT
Start: 2018-10-23 | End: 2018-10-23

## 2018-10-23 RX ORDER — ACETAMINOPHEN 500 MG
650 TABLET ORAL EVERY 6 HOURS
Qty: 0 | Refills: 0 | Status: DISCONTINUED | OUTPATIENT
Start: 2018-10-23 | End: 2018-10-31

## 2018-10-23 RX ORDER — KETOROLAC TROMETHAMINE 30 MG/ML
15 SYRINGE (ML) INJECTION EVERY 8 HOURS
Qty: 0 | Refills: 0 | Status: DISCONTINUED | OUTPATIENT
Start: 2018-10-23 | End: 2018-10-24

## 2018-10-23 RX ORDER — OXYCODONE HYDROCHLORIDE 5 MG/1
5 TABLET ORAL EVERY 4 HOURS
Qty: 0 | Refills: 0 | Status: DISCONTINUED | OUTPATIENT
Start: 2018-10-23 | End: 2018-10-23

## 2018-10-23 RX ORDER — ACETAMINOPHEN 500 MG
1000 TABLET ORAL ONCE
Qty: 0 | Refills: 0 | Status: COMPLETED | OUTPATIENT
Start: 2018-10-23 | End: 2018-10-23

## 2018-10-23 RX ORDER — ENOXAPARIN SODIUM 100 MG/ML
40 INJECTION SUBCUTANEOUS
Qty: 28 | Refills: 0 | OUTPATIENT
Start: 2018-10-23 | End: 2018-11-19

## 2018-10-23 RX ORDER — INSULIN LISPRO 100/ML
VIAL (ML) SUBCUTANEOUS AT BEDTIME
Qty: 0 | Refills: 0 | Status: DISCONTINUED | OUTPATIENT
Start: 2018-10-23 | End: 2018-11-02

## 2018-10-23 RX ORDER — HYDROMORPHONE HYDROCHLORIDE 2 MG/ML
1 INJECTION INTRAMUSCULAR; INTRAVENOUS; SUBCUTANEOUS
Qty: 0 | Refills: 0 | Status: DISCONTINUED | OUTPATIENT
Start: 2018-10-23 | End: 2018-10-23

## 2018-10-23 RX ORDER — FAMOTIDINE 10 MG/ML
20 INJECTION INTRAVENOUS DAILY
Qty: 0 | Refills: 0 | Status: DISCONTINUED | OUTPATIENT
Start: 2018-10-23 | End: 2018-10-26

## 2018-10-23 RX ORDER — SIMETHICONE 80 MG/1
80 TABLET, CHEWABLE ORAL
Qty: 0 | Refills: 0 | Status: DISCONTINUED | OUTPATIENT
Start: 2018-10-23 | End: 2018-10-24

## 2018-10-23 RX ORDER — TRAMADOL HYDROCHLORIDE 50 MG/1
25 TABLET ORAL EVERY 4 HOURS
Qty: 0 | Refills: 0 | Status: DISCONTINUED | OUTPATIENT
Start: 2018-10-23 | End: 2018-10-24

## 2018-10-23 RX ORDER — HYDROMORPHONE HYDROCHLORIDE 2 MG/ML
2 INJECTION INTRAMUSCULAR; INTRAVENOUS; SUBCUTANEOUS
Qty: 0 | Refills: 0 | Status: DISCONTINUED | OUTPATIENT
Start: 2018-10-23 | End: 2018-10-23

## 2018-10-23 RX ADMIN — FAMOTIDINE 20 MILLIGRAM(S): 10 INJECTION INTRAVENOUS at 18:08

## 2018-10-23 RX ADMIN — Medication 1: at 08:36

## 2018-10-23 RX ADMIN — ENOXAPARIN SODIUM 40 MILLIGRAM(S): 100 INJECTION SUBCUTANEOUS at 05:25

## 2018-10-23 RX ADMIN — SIMETHICONE 80 MILLIGRAM(S): 80 TABLET, CHEWABLE ORAL at 22:15

## 2018-10-23 RX ADMIN — Medication 1: at 12:05

## 2018-10-23 RX ADMIN — Medication 3: at 17:34

## 2018-10-23 RX ADMIN — Medication 1000 MILLIGRAM(S): at 12:35

## 2018-10-23 RX ADMIN — HYDROMORPHONE HYDROCHLORIDE 30 MILLILITER(S): 2 INJECTION INTRAMUSCULAR; INTRAVENOUS; SUBCUTANEOUS at 08:03

## 2018-10-23 RX ADMIN — Medication 400 MILLIGRAM(S): at 05:39

## 2018-10-23 RX ADMIN — Medication 15 MILLIGRAM(S): at 22:00

## 2018-10-23 RX ADMIN — Medication 400 MILLIGRAM(S): at 12:05

## 2018-10-23 RX ADMIN — Medication 15 MILLIGRAM(S): at 21:32

## 2018-10-23 RX ADMIN — Medication 2: at 22:14

## 2018-10-23 RX ADMIN — Medication 1000 MILLIGRAM(S): at 05:59

## 2018-10-23 NOTE — PHYSICAL THERAPY INITIAL EVALUATION ADULT - PLANNED THERAPY INTERVENTIONS, PT EVAL
balance training/postural re-education/gait training/transfer training/strengthening/bed mobility training

## 2018-10-23 NOTE — PROGRESS NOTE ADULT - SUBJECTIVE AND OBJECTIVE BOX
Anesthesia Pain Management Service    SUBJECTIVE: Patient is doing well with IV PCA and no significant problems reported.    Pain Scale Score	At rest: __4_ 	With Activity: ___ 	[X ] Refer to charted pain scores    THERAPY:    [ ] IV PCA Morphine		[ ] 5 mg/mL	[ ] 1 mg/mL  [X ] IV PCA Hydromorphone	[ ] 5 mg/mL	[X ] 1 mg/mL  [ ] IV PCA Fentanyl		[ ] 50 micrograms/mL    Demand dose __0.2_ lockout __6_ (minutes) Continuous Rate _0__ Total: _0.5__  mg used (in past 24 hours)      MEDICATIONS  (STANDING):  dextrose 5%. 1000 milliLiter(s) (50 mL/Hr) IV Continuous <Continuous>  dextrose 50% Injectable 12.5 Gram(s) IV Push once  dextrose 50% Injectable 25 Gram(s) IV Push once  dextrose 50% Injectable 25 Gram(s) IV Push once  enoxaparin Injectable 40 milliGRAM(s) SubCutaneous daily  HYDROmorphone PCA (1 mG/mL) 30 milliLiter(s) PCA Continuous PCA Continuous  insulin lispro (HumaLOG) corrective regimen sliding scale   SubCutaneous three times a day before meals  lactated ringers. 1000 milliLiter(s) (90 mL/Hr) IV Continuous <Continuous>  metoprolol tartrate Injectable 5 milliGRAM(s) IV Push every 6 hours    MEDICATIONS  (PRN):  acetaminophen  IVPB .. 1000 milliGRAM(s) IV Intermittent once PRN Moderate Pain (4 - 6)  dextrose 40% Gel 15 Gram(s) Oral once PRN Blood Glucose LESS THAN 70 milliGRAM(s)/deciliter  glucagon  Injectable 1 milliGRAM(s) IntraMuscular once PRN Glucose LESS THAN 70 milligrams/deciliter  HYDROmorphone PCA (1 mG/mL) Rescue Clinician Bolus 0.5 milliGRAM(s) IV Push every 15 minutes PRN for Pain Scale GREATER THAN 6  naloxone Injectable 0.1 milliGRAM(s) IV Push every 3 minutes PRN For ANY of the following changes in patient status:  A. RR LESS THAN 10 breaths per minute, B. Oxygen saturation LESS THAN 90%, C. Sedation score of 6  ondansetron Injectable 4 milliGRAM(s) IV Push every 6 hours PRN Nausea      OBJECTIVE: laying in bed     Sedation Score:	[ X] Alert	[ ] Drowsy 	[ ] Arousable	[ ] Asleep	[ ] Unresponsive    Side Effects:	[X ] None	[ ] Nausea	[ ] Vomiting	[ ] Pruritus  		[ ] Other:    Vital Signs Last 24 Hrs  T(C): 36.8 (23 Oct 2018 09:56), Max: 36.8 (23 Oct 2018 05:30)  T(F): 98.2 (23 Oct 2018 09:56), Max: 98.2 (23 Oct 2018 05:30)  HR: 100 (23 Oct 2018 09:56) (80 - 103)  BP: 104/37 (23 Oct 2018 09:56) (104/37 - 128/36)  BP(mean): --  RR: 17 (23 Oct 2018 09:56) (12 - 28)  SpO2: 100% (23 Oct 2018 09:56) (96% - 100%)    ASSESSMENT/ PLAN    Therapy to  be:	[ X] Continue   [ ] Discontinued   [ ] Change to prn Analgesics    Documentation and Verification of current medications:   [X] Done	[ ] Not done, not elligible    Comments: Plan to D/C PCA after lunch

## 2018-10-23 NOTE — PROGRESS NOTE ADULT - SUBJECTIVE AND OBJECTIVE BOX
POD#1   HD#2    Patient seen and examined at bedside, no acute overnight events. No acute complaints. Pt was having pain earlier which improved with IV Tylenol   Patient is not yet ambulating, not yet passing flatus, ayoub catheter in place and tolerating clears overnight. Denies CP, SOB, N/V, fevers, and chills.    Vital Signs Last 24 Hours  T(C): 36.8 (10-23-18 @ 05:30), Max: 36.8 (10-23-18 @ 05:30)  HR: 103 (10-23-18 @ 05:30) (80 - 103)  BP: 128/36 (10-23-18 @ 05:30) (106/44 - 159/65)  RR: 16 (10-23-18 @ 05:30) (12 - 28)  SpO2: 100% (10-23-18 @ 05:30) (96% - 100%)    I&O's Summary    22 Oct 2018 07:01  -  23 Oct 2018 06:37  --------------------------------------------------------  IN: 870 mL / OUT: 495 mL / NET: 375 mL        Physical Exam:  General: NAD  CV: NR, RR, S1, S2, no M/R/G  Lungs: CTA-B  Abdomen: Soft, non-tender, non-distended, +BS  Incision: vertical incision CD. bandage and abd binder in place  Ext: No pain or swelling    Labs:                        8.5    16.36 )-----------( 532      ( 22 Oct 2018 20:25 )             28.4   baso 0.1    eos 0.0    imm gran 0.3    lymph 2.1    mono 3.9    poly 93.6                 Blood Type: O Positive      MEDICATIONS  (STANDING):  dextrose 5%. 1000 milliLiter(s) (50 mL/Hr) IV Continuous <Continuous>  dextrose 50% Injectable 12.5 Gram(s) IV Push once  dextrose 50% Injectable 25 Gram(s) IV Push once  dextrose 50% Injectable 25 Gram(s) IV Push once  enoxaparin Injectable 40 milliGRAM(s) SubCutaneous daily  HYDROmorphone PCA (1 mG/mL) 30 milliLiter(s) PCA Continuous PCA Continuous  insulin lispro (HumaLOG) corrective regimen sliding scale   SubCutaneous three times a day before meals  lactated ringers. 1000 milliLiter(s) (90 mL/Hr) IV Continuous <Continuous>  metoprolol tartrate Injectable 5 milliGRAM(s) IV Push every 6 hours    MEDICATIONS  (PRN):  acetaminophen  IVPB .. 1000 milliGRAM(s) IV Intermittent once PRN Moderate Pain (4 - 6)  dextrose 40% Gel 15 Gram(s) Oral once PRN Blood Glucose LESS THAN 70 milliGRAM(s)/deciliter  glucagon  Injectable 1 milliGRAM(s) IntraMuscular once PRN Glucose LESS THAN 70 milligrams/deciliter  HYDROmorphone PCA (1 mG/mL) Rescue Clinician Bolus 0.5 milliGRAM(s) IV Push every 15 minutes PRN for Pain Scale GREATER THAN 6  naloxone Injectable 0.1 milliGRAM(s) IV Push every 3 minutes PRN For ANY of the following changes in patient status:  A. RR LESS THAN 10 breaths per minute, B. Oxygen saturation LESS THAN 90%, C. Sedation score of 6  ondansetron Injectable 4 milliGRAM(s) IV Push every 6 hours PRN Nausea POD#1   HD#2    Patient seen and examined at bedside, no acute overnight events. No acute complaints. Pt was having pain earlier which improved with IV Tylenol   Patient is not yet ambulating, not yet passing flatus, ayoub catheter in place and tolerating clears overnight. Denies CP, SOB, N/V, fevers, and chills.    Vital Signs Last 24 Hours  T(C): 36.8 (10-23-18 @ 05:30), Max: 36.8 (10-23-18 @ 05:30)  HR: 103 (10-23-18 @ 05:30) (80 - 103)  BP: 128/36 (10-23-18 @ 05:30) (106/44 - 159/65)  RR: 16 (10-23-18 @ 05:30) (12 - 28)  SpO2: 100% (10-23-18 @ 05:30) (96% - 100%)    I&O's Summary    22 Oct 2018 07:01  -  23 Oct 2018 06:37  --------------------------------------------------------  IN: 870 mL / OUT: 495 mL / NET: 375 mL        Physical Exam:  General: NAD  CV: NR, RR, S1, S2, no M/R/G  Lungs: CTA-B  Abdomen: Soft, appropriately tender, non-distended, hypoactive BS  Incision: vertical incision CD. bandage and abd binder in place  Ext: No pain or swelling    Labs:                        8.5    16.36 )-----------( 532      ( 22 Oct 2018 20:25 )             28.4   baso 0.1    eos 0.0    imm gran 0.3    lymph 2.1    mono 3.9    poly 93.6                 Blood Type: O Positive      MEDICATIONS  (STANDING):  dextrose 5%. 1000 milliLiter(s) (50 mL/Hr) IV Continuous <Continuous>  dextrose 50% Injectable 12.5 Gram(s) IV Push once  dextrose 50% Injectable 25 Gram(s) IV Push once  dextrose 50% Injectable 25 Gram(s) IV Push once  enoxaparin Injectable 40 milliGRAM(s) SubCutaneous daily  HYDROmorphone PCA (1 mG/mL) 30 milliLiter(s) PCA Continuous PCA Continuous  insulin lispro (HumaLOG) corrective regimen sliding scale   SubCutaneous three times a day before meals  lactated ringers. 1000 milliLiter(s) (90 mL/Hr) IV Continuous <Continuous>  metoprolol tartrate Injectable 5 milliGRAM(s) IV Push every 6 hours    MEDICATIONS  (PRN):  acetaminophen  IVPB .. 1000 milliGRAM(s) IV Intermittent once PRN Moderate Pain (4 - 6)  dextrose 40% Gel 15 Gram(s) Oral once PRN Blood Glucose LESS THAN 70 milliGRAM(s)/deciliter  glucagon  Injectable 1 milliGRAM(s) IntraMuscular once PRN Glucose LESS THAN 70 milligrams/deciliter  HYDROmorphone PCA (1 mG/mL) Rescue Clinician Bolus 0.5 milliGRAM(s) IV Push every 15 minutes PRN for Pain Scale GREATER THAN 6  naloxone Injectable 0.1 milliGRAM(s) IV Push every 3 minutes PRN For ANY of the following changes in patient status:  A. RR LESS THAN 10 breaths per minute, B. Oxygen saturation LESS THAN 90%, C. Sedation score of 6  ondansetron Injectable 4 milliGRAM(s) IV Push every 6 hours PRN Nausea

## 2018-10-23 NOTE — PHYSICAL THERAPY INITIAL EVALUATION ADULT - GENERAL OBSERVATIONS, REHAB EVAL
Consult received, chart reviewed. Patient received supine in bed, NAD, + son present. Patient agreed to EVALUATION from Physical Therapist.

## 2018-10-23 NOTE — CHART NOTE - NSCHARTNOTEFT_GEN_A_CORE
GYN ONC Progress Note    Patient seen and examined. Feeling well. Complains of pain with movement. Seen by PT today. Tolerating PO this afternoon. Due to void. No nausea, emesis, CP/SOB, fevers, chills    100   114/50 (manual)   36.7   17   100%RA  NAD  Soft mild distension, NT, no rebound/guarding    Pt is a 77 yo POD#1 s/p Ex-lap, FEDERICO, Bilateral Salpingo-oophorectomy, resection of pelvic mass, pelvic and para-aortic lymph node dissection, Omentectomy. 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

## 2018-10-23 NOTE — PHYSICAL THERAPY INITIAL EVALUATION ADULT - PERTINENT HX OF CURRENT PROBLEM, REHAB EVAL
Pt is a 79 yo POD#1 s/p Ex-lap, FEDERICO, Bilateral Salpingo-oophorectomy, resection of pelvic mass, pelvic and para-aortic lymph node dissection, Omentectomy. 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

## 2018-10-23 NOTE — PHYSICAL THERAPY INITIAL EVALUATION ADULT - CRITERIA FOR SKILLED THERAPEUTIC INTERVENTIONS
impairments found/rehab potential/therapy frequency/predicted duration of therapy intervention/anticipated equipment needs at discharge/anticipated discharge recommendation

## 2018-10-23 NOTE — CHART NOTE - NSCHARTNOTEFT_GEN_A_CORE
Home Lovenox is set up with patients home pharmacy ( [facility]). It is available and ready for  upon discharge. Co-pay is $7.50.  and patient informed. Lovenox teaching ordered.

## 2018-10-23 NOTE — PROGRESS NOTE ADULT - ASSESSMENT
Pt is a 77 yo POD#1 s/p Ex-lap, FEDERICO, Bilateral Salpingo-oophorectomy, resection of pelvic mass, pelvic and para-aortic lymph node dissection, Omentectomy. 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 No acute overnight events. Pt is HD stable. Pain is well controlled

## 2018-10-23 NOTE — PROGRESS NOTE ADULT - PROBLEM SELECTOR PLAN 1
Neuro: c/w PCA in the AM. Will transition to oral pain meds later this AM.   CV: VSS, CBC in AM. Lopressor until pt tolerating reg diet  Pulm: Saturating well on room air, encourage incentive spirometry  GI: Reg ADA diet as tolerated  : UOP adequate, d/c ayoub later today when pt is more awake and able to ambulate to restroom  Endo: ISS  Heme: Lovenox and SCDs for DVT ppx. Will need home lovenox for presumed hyper coagulable state in the setting of malignancy   Dispo: Continue routine post-op care    RJ Baca, pgy2 Neuro: c/w PCA in the AM. Will transition to oral pain meds later this AM.   CV: VSS, CBC in AM. Lopressor until pt tolerating reg diet  Pulm: Saturating well on room air, encourage incentive spirometry  GI: Reg ADA diet as tolerated  : UOP adequate, d/c ayoub later today when pt is more awake and able to ambulate to restroom  Endo: ISS  Heme: Lovenox and SCDs for DVT ppx. Will need home lovenox for presumed hyper coagulable state in the setting of malignancy   Dispo: Continue routine post-op care. PT consult placed for post-operative state and hx of PEYTON Baca, pgy2

## 2018-10-24 ENCOUNTER — TRANSCRIPTION ENCOUNTER (OUTPATIENT)
Age: 78
End: 2018-10-24

## 2018-10-24 LAB
BASOPHILS # BLD AUTO: 0.02 K/UL — SIGNIFICANT CHANGE UP (ref 0–0.2)
BASOPHILS NFR BLD AUTO: 0.2 % — SIGNIFICANT CHANGE UP (ref 0–2)
BUN SERPL-MCNC: 16 MG/DL — SIGNIFICANT CHANGE UP (ref 7–23)
CALCIUM SERPL-MCNC: 7.3 MG/DL — LOW (ref 8.4–10.5)
CHLORIDE SERPL-SCNC: 95 MMOL/L — LOW (ref 98–107)
CO2 SERPL-SCNC: 21 MMOL/L — LOW (ref 22–31)
CREAT SERPL-MCNC: 0.43 MG/DL — LOW (ref 0.5–1.3)
EOSINOPHIL # BLD AUTO: 0.05 K/UL — SIGNIFICANT CHANGE UP (ref 0–0.5)
EOSINOPHIL NFR BLD AUTO: 0.6 % — SIGNIFICANT CHANGE UP (ref 0–6)
GLUCOSE BLDC GLUCOMTR-MCNC: 187 MG/DL — HIGH (ref 70–99)
GLUCOSE BLDC GLUCOMTR-MCNC: 228 MG/DL — HIGH (ref 70–99)
GLUCOSE BLDC GLUCOMTR-MCNC: 238 MG/DL — HIGH (ref 70–99)
GLUCOSE BLDC GLUCOMTR-MCNC: 239 MG/DL — HIGH (ref 70–99)
GLUCOSE SERPL-MCNC: 248 MG/DL — HIGH (ref 70–99)
HCT VFR BLD CALC: 23.9 % — LOW (ref 34.5–45)
HCT VFR BLD CALC: 29.8 % — LOW (ref 34.5–45)
HGB BLD-MCNC: 7.4 G/DL — LOW (ref 11.5–15.5)
HGB BLD-MCNC: 9.5 G/DL — LOW (ref 11.5–15.5)
IMM GRANULOCYTES # BLD AUTO: 0.05 # — SIGNIFICANT CHANGE UP
IMM GRANULOCYTES NFR BLD AUTO: 0.6 % — SIGNIFICANT CHANGE UP (ref 0–1.5)
LYMPHOCYTES # BLD AUTO: 0.79 K/UL — LOW (ref 1–3.3)
LYMPHOCYTES # BLD AUTO: 9 % — LOW (ref 13–44)
MAGNESIUM SERPL-MCNC: 1.9 MG/DL — SIGNIFICANT CHANGE UP (ref 1.6–2.6)
MCHC RBC-ENTMCNC: 19.5 PG — LOW (ref 27–34)
MCHC RBC-ENTMCNC: 31 % — LOW (ref 32–36)
MCV RBC AUTO: 62.9 FL — LOW (ref 80–100)
MONOCYTES # BLD AUTO: 0.59 K/UL — SIGNIFICANT CHANGE UP (ref 0–0.9)
MONOCYTES NFR BLD AUTO: 6.7 % — SIGNIFICANT CHANGE UP (ref 2–14)
NEUTROPHILS # BLD AUTO: 7.28 K/UL — SIGNIFICANT CHANGE UP (ref 1.8–7.4)
NEUTROPHILS NFR BLD AUTO: 82.9 % — HIGH (ref 43–77)
NRBC # FLD: 0 — SIGNIFICANT CHANGE UP
PHOSPHATE SERPL-MCNC: 1.6 MG/DL — LOW (ref 2.5–4.5)
PLATELET # BLD AUTO: 486 K/UL — HIGH (ref 150–400)
PMV BLD: 9.3 FL — SIGNIFICANT CHANGE UP (ref 7–13)
POTASSIUM SERPL-MCNC: 4.2 MMOL/L — SIGNIFICANT CHANGE UP (ref 3.5–5.3)
POTASSIUM SERPL-SCNC: 4.2 MMOL/L — SIGNIFICANT CHANGE UP (ref 3.5–5.3)
RBC # BLD: 3.8 M/UL — SIGNIFICANT CHANGE UP (ref 3.8–5.2)
RBC # FLD: 16 % — HIGH (ref 10.3–14.5)
SODIUM SERPL-SCNC: 127 MMOL/L — LOW (ref 135–145)
SODIUM SERPL-SCNC: 129 MMOL/L — LOW (ref 135–145)
WBC # BLD: 8.78 K/UL — SIGNIFICANT CHANGE UP (ref 3.8–10.5)
WBC # FLD AUTO: 8.78 K/UL — SIGNIFICANT CHANGE UP (ref 3.8–10.5)

## 2018-10-24 RX ORDER — ACETAMINOPHEN 500 MG
975 TABLET ORAL ONCE
Qty: 0 | Refills: 0 | Status: COMPLETED | OUTPATIENT
Start: 2018-10-24 | End: 2018-10-24

## 2018-10-24 RX ORDER — IBUPROFEN 200 MG
600 TABLET ORAL EVERY 6 HOURS
Qty: 0 | Refills: 0 | Status: DISCONTINUED | OUTPATIENT
Start: 2018-10-24 | End: 2018-10-24

## 2018-10-24 RX ORDER — ACETAMINOPHEN 500 MG
2 TABLET ORAL
Qty: 0 | Refills: 0 | COMMUNITY
Start: 2018-10-24

## 2018-10-24 RX ORDER — SODIUM,POTASSIUM PHOSPHATES 278-250MG
1 POWDER IN PACKET (EA) ORAL
Qty: 0 | Refills: 0 | Status: DISCONTINUED | OUTPATIENT
Start: 2018-10-24 | End: 2018-10-24

## 2018-10-24 RX ORDER — TRAMADOL HYDROCHLORIDE 50 MG/1
1 TABLET ORAL
Qty: 10 | Refills: 0 | OUTPATIENT
Start: 2018-10-24

## 2018-10-24 RX ORDER — TRAMADOL HYDROCHLORIDE 50 MG/1
25 TABLET ORAL EVERY 4 HOURS
Qty: 0 | Refills: 0 | Status: DISCONTINUED | OUTPATIENT
Start: 2018-10-24 | End: 2018-10-24

## 2018-10-24 RX ORDER — ENOXAPARIN SODIUM 100 MG/ML
40 INJECTION SUBCUTANEOUS DAILY
Qty: 0 | Refills: 0 | Status: DISCONTINUED | OUTPATIENT
Start: 2018-10-24 | End: 2018-11-02

## 2018-10-24 RX ORDER — GLUCAGON INJECTION, SOLUTION 0.5 MG/.1ML
1 INJECTION, SOLUTION SUBCUTANEOUS ONCE
Qty: 0 | Refills: 0 | Status: DISCONTINUED | OUTPATIENT
Start: 2018-10-24 | End: 2018-11-02

## 2018-10-24 RX ORDER — DEXTROSE 50 % IN WATER 50 %
15 SYRINGE (ML) INTRAVENOUS ONCE
Qty: 0 | Refills: 0 | Status: DISCONTINUED | OUTPATIENT
Start: 2018-10-24 | End: 2018-11-02

## 2018-10-24 RX ORDER — TRAMADOL HYDROCHLORIDE 50 MG/1
25 TABLET ORAL EVERY 6 HOURS
Qty: 0 | Refills: 0 | Status: DISCONTINUED | OUTPATIENT
Start: 2018-10-24 | End: 2018-10-24

## 2018-10-24 RX ORDER — SODIUM,POTASSIUM PHOSPHATES 278-250MG
1 POWDER IN PACKET (EA) ORAL ONCE
Qty: 0 | Refills: 0 | Status: COMPLETED | OUTPATIENT
Start: 2018-10-24 | End: 2018-10-24

## 2018-10-24 RX ORDER — DOCUSATE SODIUM 100 MG
100 CAPSULE ORAL
Qty: 0 | Refills: 0 | Status: DISCONTINUED | OUTPATIENT
Start: 2018-10-24 | End: 2018-11-02

## 2018-10-24 RX ORDER — SODIUM CHLORIDE 9 MG/ML
1000 INJECTION, SOLUTION INTRAVENOUS
Qty: 0 | Refills: 0 | Status: DISCONTINUED | OUTPATIENT
Start: 2018-10-24 | End: 2018-11-02

## 2018-10-24 RX ORDER — INSULIN LISPRO 100/ML
VIAL (ML) SUBCUTANEOUS
Qty: 0 | Refills: 0 | Status: DISCONTINUED | OUTPATIENT
Start: 2018-10-24 | End: 2018-11-02

## 2018-10-24 RX ORDER — IBUPROFEN 200 MG
2 TABLET ORAL
Qty: 0 | Refills: 0 | COMMUNITY

## 2018-10-24 RX ORDER — LISINOPRIL 2.5 MG/1
20 TABLET ORAL DAILY
Qty: 0 | Refills: 0 | Status: DISCONTINUED | OUTPATIENT
Start: 2018-10-24 | End: 2018-11-02

## 2018-10-24 RX ORDER — SIMETHICONE 80 MG/1
80 TABLET, CHEWABLE ORAL EVERY 8 HOURS
Qty: 0 | Refills: 0 | Status: DISCONTINUED | OUTPATIENT
Start: 2018-10-24 | End: 2018-10-26

## 2018-10-24 RX ORDER — SENNA PLUS 8.6 MG/1
2 TABLET ORAL AT BEDTIME
Qty: 0 | Refills: 0 | Status: DISCONTINUED | OUTPATIENT
Start: 2018-10-24 | End: 2018-11-02

## 2018-10-24 RX ADMIN — Medication 650 MILLIGRAM(S): at 01:07

## 2018-10-24 RX ADMIN — Medication 5 MILLIGRAM(S): at 17:40

## 2018-10-24 RX ADMIN — Medication 650 MILLIGRAM(S): at 17:40

## 2018-10-24 RX ADMIN — Medication 4: at 12:52

## 2018-10-24 RX ADMIN — Medication 4: at 08:45

## 2018-10-24 RX ADMIN — Medication 15 MILLIGRAM(S): at 05:44

## 2018-10-24 RX ADMIN — ENOXAPARIN SODIUM 40 MILLIGRAM(S): 100 INJECTION SUBCUTANEOUS at 12:52

## 2018-10-24 RX ADMIN — Medication 500 MILLIGRAM(S): at 20:45

## 2018-10-24 RX ADMIN — SIMETHICONE 80 MILLIGRAM(S): 80 TABLET, CHEWABLE ORAL at 22:23

## 2018-10-24 RX ADMIN — Medication 15 MILLIGRAM(S): at 14:19

## 2018-10-24 RX ADMIN — Medication 975 MILLIGRAM(S): at 08:12

## 2018-10-24 RX ADMIN — Medication 650 MILLIGRAM(S): at 18:10

## 2018-10-24 RX ADMIN — Medication 5 MILLIGRAM(S): at 17:41

## 2018-10-24 RX ADMIN — SIMETHICONE 80 MILLIGRAM(S): 80 TABLET, CHEWABLE ORAL at 12:53

## 2018-10-24 RX ADMIN — SIMETHICONE 80 MILLIGRAM(S): 80 TABLET, CHEWABLE ORAL at 17:38

## 2018-10-24 RX ADMIN — FAMOTIDINE 20 MILLIGRAM(S): 10 INJECTION INTRAVENOUS at 12:52

## 2018-10-24 RX ADMIN — SIMETHICONE 80 MILLIGRAM(S): 80 TABLET, CHEWABLE ORAL at 05:45

## 2018-10-24 RX ADMIN — Medication 500 MILLIGRAM(S): at 19:55

## 2018-10-24 RX ADMIN — Medication 1 PACKET(S): at 17:36

## 2018-10-24 RX ADMIN — Medication 4: at 17:34

## 2018-10-24 RX ADMIN — Medication 100 MILLIGRAM(S): at 22:23

## 2018-10-24 NOTE — CHART NOTE - NSCHARTNOTEFT_GEN_A_CORE
Patient seen and examined at bedside. No acute complaints. Pain well controlled. Patient tolerating regular diet. Has not yet passed flatus. Voiding freely. Denies CP, SOB, N/V, fevers, and chills.    Vital Signs Last 24 Hours  T(C): 36.6 (10-24-18 @ 17:34), Max: 37 (10-23-18 @ 20:30)  HR: 105 (10-24-18 @ 17:34) (100 - 108)  BP: 172/76 (10-24-18 @ 17:34) (120/50 - 172/76)  RR: 17 (10-24-18 @ 17:34) (16 - 18)  SpO2: 96% (10-24-18 @ 17:34) (94% - 96%)    I&O's Summary    23 Oct 2018 07:01  -  24 Oct 2018 07:00  --------------------------------------------------------  IN: 0 mL / OUT: 975 mL / NET: -975 mL    24 Oct 2018 07:01  -  24 Oct 2018 18:40  --------------------------------------------------------  IN: 0 mL / OUT: 300 mL / NET: -300 mL          Labs:             9.5    x     )-----------( x        ( 10-24 @ 16:20 )             29.8                7.4    8.78  )-----------( 486      ( 10-24 @ 03:57 )             23.9                8.6    14.28 )-----------( 622      ( 10-23 @ 05:45 )             28.7                8.5    16.36 )-----------( 532      ( 10-22 @ 20:25 )             28.4         MEDICATIONS  (STANDING):  acetaminophen   Tablet .. 650 milliGRAM(s) Oral every 6 hours  dextrose 5%. 1000 milliLiter(s) (50 mL/Hr) IV Continuous <Continuous>  dextrose 50% Injectable 25 Gram(s) IV Push once  enoxaparin Injectable 40 milliGRAM(s) SubCutaneous daily  famotidine    Tablet 20 milliGRAM(s) Oral daily  glipiZIDE 10 milliGRAM(s) Oral <User Schedule>  glipiZIDE 5 milliGRAM(s) Oral <User Schedule>  insulin lispro (HumaLOG) corrective regimen sliding scale   SubCutaneous three times a day before meals  insulin lispro (HumaLOG) corrective regimen sliding scale   SubCutaneous at bedtime  metoprolol tartrate Injectable 5 milliGRAM(s) IV Push every 6 hours  simethicone 80 milliGRAM(s) Chew five times a day    MEDICATIONS  (PRN):  dextrose 40% Gel 15 Gram(s) Oral once PRN Blood Glucose LESS THAN 70 milliGRAM(s)/deciliter  glucagon  Injectable 1 milliGRAM(s) IntraMuscular once PRN Glucose LESS THAN 70 milligrams/deciliter  ibuprofen  Tablet. 600 milliGRAM(s) Oral every 6 hours PRN Moderate Pain (4 - 6)  traMADol 25 milliGRAM(s) Oral every 4 hours PRN Severe Pain (7 - 10)      78y F admitted w/ pelvic mass now POD2 s/p Ex-lap, FEDERICO, BSO, resection of pelvic mass, PPALND, omentectomy and 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  Patient has been low-grade tachycardic with symptoms of anemia in setting of Hct drop from 37.8->28.4->28.7->23.9.  She is tolerating liquid diet, voiding spontaneously, and ambulating with assistance. Pain is controlled.      NEURO: continue tylenol po, toradol, tramadol for pain  CV: low grade tachy w/ ssx of acute blood loss anemia; s/p 1uPRBC with appropriate rise in H&H  PULM: saturating well on RA; increase incentive spirometry  GI: c/w ADA reg diet; c/w pepcid, mylicon; replete NaPhos  : voiding spontaneously; continue strict I/Os  HEME: c/w Lovenox; pt will need home lovenox for hypercoagulable state in setting of malignancy.  c/w venodynes and increase ambulation w/ assistance  ENDO: c/w FSG; ISS increased to moderate due to elevated FSG. restarted home glipizide  ID: afebrile  DISPO: continue routine postop care    Valerie Spencer PGY-3

## 2018-10-24 NOTE — DISCHARGE NOTE ADULT - MEDICATION SUMMARY - MEDICATIONS TO TAKE
I will START or STAY ON the medications listed below when I get home from the hospital:    ibuprofen 200 mg oral tablet  -- 2 tab(s) by mouth every 6 hours, As Needed  -- Indication: For Pain    acetaminophen 325 mg oral tablet  -- 2 tab(s) by mouth every 6 hours, As Needed  -- Indication: For Pain    ramipril 5 mg oral capsule  -- 1 cap(s) by mouth once a day am  -- Indication: For Htn    enoxaparin 40 mg/0.4 mL injectable solution  -- 40 milligram(s) injectable once a day MDD:0.4mL  -- It is very important that you take or use this exactly as directed.  Do not skip doses or discontinue unless directed by your doctor.    -- Indication: For Blood clot prevention    GlipiZIDE XL 10 mg oral tablet, extended release  -- 1 tab(s) by mouth once a day am  -- Indication: For dm    glipiZIDE 5 mg oral tablet  -- 1 tab(s) by mouth once a day  1/2 hour prior to dinner  -- Indication: For dm    sodium chloride 1 g oral tablet  -- 1 tab(s) by mouth every 8 hours  -- Indication: For Hyponatremia

## 2018-10-24 NOTE — PROGRESS NOTE ADULT - PROBLEM SELECTOR PLAN 1
NEURO: continue tylenol po, toradol, tramadol for pain  CV: low grade tachy w/ ssx of acute blood loss anemia; plan for 2u PRBC today  PULM: saturating well on RA; increase incentive spirometry  GI: c/w ADA reg diet; c/w pepcid, mylicon  : voiding spontaneously; continue strict I/Os  HEME: c/w Lovenox; pt will need home lovenox for hypercoagulable state in setting of malignancy.  c/w venodynes and increase ambulation w/ assistance  ENDO: c/w ISS and finger sticks  ID: afebrile  DISPO: continue routine postop care    YAIMA Holman MD PGY2 NEURO: continue tylenol po, toradol, tramadol for pain  CV: low grade tachy w/ ssx of acute blood loss anemia; plan for 2u PRBC today  PULM: saturating well on RA; increase incentive spirometry  GI: c/w ADA reg diet; c/w pepcid, mylicon; replete NaPhos  : voiding spontaneously; continue strict I/Os  HEME: c/w Lovenox; pt will need home lovenox for hypercoagulable state in setting of malignancy.  c/w venodynes and increase ambulation w/ assistance  ENDO: c/w FSG; ISS increased to moderate due to elevated FSG  ID: afebrile  DISPO: continue routine postop care    YAIMA Holman MD PGY2

## 2018-10-24 NOTE — DISCHARGE NOTE ADULT - OTHER SIGNIFICANT FINDINGS
SURGICAL PATHOLOGY:  Final Diagnosis  1. 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 Omentum, omentectomy: Omentum with reactive mesothelial hyperplasia  4. Lymph nodes, para-aortic, right, excision: Six reactive lymph nodes, no tumor identified (0/6)  5. Lymph nodes, para-aortic, left, excision: Seven reactive lymph nodes, no tumor identified (0/7)  6. Lymph nodes, pelvic, left, excision: One reactive lymph node, no tumor identified (0/1)  7. Lymph nodes, pelvic, right, excision: Two reactive lymph nodes, no tumor identified (0/2)  8. Diaphragmatic peritoneum, left, biopsy: Benign fibroadipose tissue  9. Paracolic peritoneum, left, biopsy: Benign fibroadipose tissue  10. Pelvic peritoneum, biopsy: Benign fibroadipose tissue with acute inflammation  11. Paracolic peritoneum, right, biopsy: Benign fibroconnective tissue  12. Diaphragmatic peritoneum, right, biopsy: Benign fibroconnective tissue

## 2018-10-24 NOTE — DISCHARGE NOTE ADULT - CARE PLAN
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 Nothing in the vagina for 6 weeks including no tampons, no douching, no tub baths and no intercourse.  2. Please call the office or go to the Emergency Room if you have any of the following: fever over 100.4F, pain not controlled by oral pain medications, difficulty urinating, vaginal bleeding, or for any other concerns. 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  Assessment and plan of treatment:	1. Nothing in the vagina for 6 weeks including no tampons, no douching, no tub baths and no intercourse.  2. Please call the office or go to the Emergency Room if you have any of the following: fever over 100.4F, pain not controlled by oral pain medications, difficulty urinating, vaginal bleeding, or for any other concerns.

## 2018-10-24 NOTE — PROGRESS NOTE ADULT - SUBJECTIVE AND OBJECTIVE BOX
R2 GYN ONC PROGRESS NOTE    POD#1   HD#2    SUBJECTIVE:  Patient seen and examined at bedside.  Overnight her Phosphate was repleted and her oxycodone was changed to tramadol due to patient preference.  Additionally, she had low grade tachycardia to max 108 overnight and c/o mild dizzyness.  This morning she reports feeling somewhat lightheaded.   Her pain is well controlled.  She is ambulating with assistance around the room.  She denies flatus.  She is voiding spontaneously at bedside commode w/ assistance.  She is tolerating liquids.  Denies CP, SOB, N/V, fevers, and chills.    OBJECTIVE:  Vital Signs Last 24 Hours  T(C): 36.6 (10-24-18 @ 06:03), Max: 37 (10-23-18 @ 20:30)  HR: 107 (10-24-18 @ 06:03) (100 - 108)  BP: 148/54 (10-24-18 @ 06:03) (104/37 - 148/54)  RR: 18 (10-24-18 @ 06:03) (16 - 18)  SpO2: 95% (10-24-18 @ 06:03) (94% - 100%)    I&O's Summary    22 Oct 2018 07:01  -  23 Oct 2018 07:00  --------------------------------------------------------  IN: 870 mL / OUT: 745 mL / NET: 125 mL    23 Oct 2018 07:01  -  24 Oct 2018 06:46  --------------------------------------------------------  IN: 0 mL / OUT: 975 mL / NET: -975 mL    Physical Exam:  General: NAD  CV: NR, RR, S1, S2, no M/R/G  Lungs: CTA-B  Abdomen: soft, nondistended, appropriately tender, normal BS  Incision: midline vertical, primary dressing changed on exam, c/d/i w/ steris  Ext: No pain or swelling    Labs:                        7.4    8.78  )-----------( 486      ( 24 Oct 2018 03:57 )             23.9   baso 0.2    eos 0.6    imm gran 0.6    lymph 9.0    mono 6.7    poly 82.9                         8.6    14.28 )-----------( 622      ( 23 Oct 2018 05:45 )             28.7   baso 0.1    eos 0.0    imm gran 0.4    lymph 5.3    mono 7.1    poly 87.1                         8.5    16.36 )-----------( 532      ( 22 Oct 2018 20:25 )             28.4   baso 0.1    eos 0.0    imm gran 0.3    lymph 2.1    mono 3.9    poly 93.6     10-24    127<L>  |  95<L>  |  16  ----------------------------<  248<H>  4.2   |  21<L>  |  0.43<L>    Ca    7.3<L>      24 Oct 2018 03:57  Phos  1.6     10-24  Mg     1.9     10-24    Blood Type: O Positive    MEDICATIONS  (STANDING):  acetaminophen   Tablet .. 650 milliGRAM(s) Oral every 6 hours  dextrose 5%. 1000 milliLiter(s) (50 mL/Hr) IV Continuous <Continuous>  dextrose 50% Injectable 12.5 Gram(s) IV Push once  dextrose 50% Injectable 25 Gram(s) IV Push once  dextrose 50% Injectable 25 Gram(s) IV Push once  enoxaparin Injectable 40 milliGRAM(s) SubCutaneous daily  famotidine    Tablet 20 milliGRAM(s) Oral daily  insulin lispro (HumaLOG) corrective regimen sliding scale   SubCutaneous three times a day before meals  insulin lispro (HumaLOG) corrective regimen sliding scale   SubCutaneous at bedtime  ketorolac   Injectable 15 milliGRAM(s) IV Push every 8 hours  metoprolol tartrate Injectable 5 milliGRAM(s) IV Push every 6 hours  potassium acid phosphate/sodium acid phosphate tablet (K-PHOS No. 2) 1 Tablet(s) Oral four times a day with meals  simethicone 80 milliGRAM(s) Chew five times a day    MEDICATIONS  (PRN):  acetaminophen  IVPB .. 1000 milliGRAM(s) IV Intermittent once PRN Moderate Pain (4 - 6)  dextrose 40% Gel 15 Gram(s) Oral once PRN Blood Glucose LESS THAN 70 milliGRAM(s)/deciliter  glucagon  Injectable 1 milliGRAM(s) IntraMuscular once PRN Glucose LESS THAN 70 milligrams/deciliter  traMADol 25 milliGRAM(s) Oral every 4 hours PRN Moderate Pain (4 - 6) R2 GYN ONC PROGRESS NOTE    POD#2   HD#3    SUBJECTIVE:  Patient seen and examined at bedside.  Overnight her Phosphate was repleted and her oxycodone was changed to tramadol due to patient preference.  Additionally, she had low grade tachycardia to max 108 overnight and c/o mild dizzyness.  This morning she reports feeling somewhat lightheaded.   Her pain is well controlled.  She is ambulating with assistance around the room.  She denies flatus.  She is voiding spontaneously at bedside commode w/ assistance.  She is tolerating liquids.  Denies CP, SOB, N/V, fevers, and chills.    OBJECTIVE:  Vital Signs Last 24 Hours  T(C): 36.6 (10-24-18 @ 06:03), Max: 37 (10-23-18 @ 20:30)  HR: 107 (10-24-18 @ 06:03) (100 - 108)  BP: 148/54 (10-24-18 @ 06:03) (104/37 - 148/54)  RR: 18 (10-24-18 @ 06:03) (16 - 18)  SpO2: 95% (10-24-18 @ 06:03) (94% - 100%)    I&O's Summary    22 Oct 2018 07:01  -  23 Oct 2018 07:00  --------------------------------------------------------  IN: 870 mL / OUT: 745 mL / NET: 125 mL    23 Oct 2018 07:01  -  24 Oct 2018 06:46  --------------------------------------------------------  IN: 0 mL / OUT: 975 mL / NET: -975 mL    Physical Exam:  General: NAD  CV: NR, RR, S1, S2, no M/R/G  Lungs: CTA-B  Abdomen: soft, nondistended, appropriately tender, normal BS  Incision: midline vertical, primary dressing changed on exam, c/d/i w/ steris  Ext: No pain or swelling    Labs:                        7.4    8.78  )-----------( 486      ( 24 Oct 2018 03:57 )             23.9   baso 0.2    eos 0.6    imm gran 0.6    lymph 9.0    mono 6.7    poly 82.9                         8.6    14.28 )-----------( 622      ( 23 Oct 2018 05:45 )             28.7   baso 0.1    eos 0.0    imm gran 0.4    lymph 5.3    mono 7.1    poly 87.1                         8.5    16.36 )-----------( 532      ( 22 Oct 2018 20:25 )             28.4   baso 0.1    eos 0.0    imm gran 0.3    lymph 2.1    mono 3.9    poly 93.6     10-24    127<L>  |  95<L>  |  16  ----------------------------<  248<H>  4.2   |  21<L>  |  0.43<L>    Ca    7.3<L>      24 Oct 2018 03:57  Phos  1.6     10-24  Mg     1.9     10-24    Blood Type: O Positive    MEDICATIONS  (STANDING):  acetaminophen   Tablet .. 650 milliGRAM(s) Oral every 6 hours  dextrose 5%. 1000 milliLiter(s) (50 mL/Hr) IV Continuous <Continuous>  dextrose 50% Injectable 12.5 Gram(s) IV Push once  dextrose 50% Injectable 25 Gram(s) IV Push once  dextrose 50% Injectable 25 Gram(s) IV Push once  enoxaparin Injectable 40 milliGRAM(s) SubCutaneous daily  famotidine    Tablet 20 milliGRAM(s) Oral daily  insulin lispro (HumaLOG) corrective regimen sliding scale   SubCutaneous three times a day before meals  insulin lispro (HumaLOG) corrective regimen sliding scale   SubCutaneous at bedtime  ketorolac   Injectable 15 milliGRAM(s) IV Push every 8 hours  metoprolol tartrate Injectable 5 milliGRAM(s) IV Push every 6 hours  potassium acid phosphate/sodium acid phosphate tablet (K-PHOS No. 2) 1 Tablet(s) Oral four times a day with meals  simethicone 80 milliGRAM(s) Chew five times a day    MEDICATIONS  (PRN):  acetaminophen  IVPB .. 1000 milliGRAM(s) IV Intermittent once PRN Moderate Pain (4 - 6)  dextrose 40% Gel 15 Gram(s) Oral once PRN Blood Glucose LESS THAN 70 milliGRAM(s)/deciliter  glucagon  Injectable 1 milliGRAM(s) IntraMuscular once PRN Glucose LESS THAN 70 milligrams/deciliter  traMADol 25 milliGRAM(s) Oral every 4 hours PRN Moderate Pain (4 - 6)

## 2018-10-24 NOTE — DISCHARGE NOTE ADULT - PATIENT PORTAL LINK FT
You can access the [facility] Patient Portal, offered by [facility], by registering with the following website: [email or web address]

## 2018-10-24 NOTE — DISCHARGE NOTE ADULT - PLAN OF CARE
Recovery 1. Nothing in the vagina for 6 weeks including no tampons, no douching, no tub baths and no intercourse.  2. Please call the office or go to the Emergency Room if you have any of the following: fever over 100.4F, pain not controlled by oral pain medications, difficulty urinating, vaginal bleeding, or for any other concerns. Return to activities of daily living.

## 2018-10-24 NOTE — DISCHARGE NOTE ADULT - CARE PROVIDERS DIRECT ADDRESSES
[email or web address]

## 2018-10-24 NOTE — DISCHARGE NOTE ADULT - CARE PROVIDER_API CALL
Rocio Harrington), Gynecologic Oncology; Obstetrics and Gynecology  [facility] [facility]  [address] [place] [address]  Phone: [phone] option 2  Fax: [phone]

## 2018-10-24 NOTE — PROGRESS NOTE ADULT - ASSESSMENT
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  Patient has been low-grade tachycardic with symptoms of anemia in setting of Hct drop from 37.8->28.4->28.7->23.9.  She is tolerating liquid diet, voiding spontaneously, and ambulating with assistance. Pain is controlled. 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  Patient has been low-grade tachycardic with symptoms of anemia in setting of Hct drop from 37.8->28.4->28.7->23.9.  She is tolerating liquid diet, voiding spontaneously, and ambulating with assistance. Pain is controlled.

## 2018-10-24 NOTE — DISCHARGE NOTE ADULT - HOSPITAL COURSE
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  .  The patient was extubated and awoken in the OR and taken to the PACU in stable condition with PCA for pain and HSQ for VTE prophylaxis.  On POD1 the patient was transitioned to IV and PO pain meds with good pain control.  She was started on lovenox for VTE prophylaxis; she requires home lovenox in the setting of hypercoagulable state due to malignancy.  She was able to tolerate regular diet,  ayoub catheter was removed and she voided spontaneously, and she was able to ambulate with assistance.  She was seen by Physical therapy who recommended transfer to rehab facility on discharge.  On POD2 she was tranfused 1 unit pRBC for symptomatic anemia with low grade tachycardia; Hct Trend: 37.8->28.4->28.7->23.9->1u pRBC->____.  She was transitioned to PO pain meds and had good pain control.  On POD3...    On the day of discharge the patient was stable and afebrile, voiding spontaneously, tolerating regular diet, ambulating at baseline and had pain well controlled with oral pain medications.  Patient to have close follow up with Dr. Harrington outpatient. 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  .  The patient was extubated and awoken in the OR and taken to the PACU in stable condition with PCA for pain and HSQ for VTE prophylaxis.  On POD1 the patient was transitioned to IV and PO pain meds with good pain control.  She was started on lovenox for VTE prophylaxis; she requires home lovenox in the setting of hypercoagulable state due to malignancy.  She was able to tolerate regular diet,  ayoub catheter was removed and she voided spontaneously, and she was able to ambulate with assistance.  She was seen by Physical therapy who recommended rolling walker, ambulation with assistance and transfer to rehab facility on discharge.  On POD2 she was tranfused 1 unit pRBC for symptomatic anemia with low grade tachycardia; Hct Trend: 37.8->28.4->28.7->23.9->1u pRBC->29.5->32.0.  She was transitioned to PO pain meds and had good pain control.  Patient had persistent nausea on POD4-7.  She was given rectal suppository on POD6 and had bowel movements.  Nausea was controlled with IV anteemetics.  On POD7 the patient had hyponatremia.  Nephrology consult was called; hypo-osmolar hyponatremia secondary to elevated ADH release in the setting of persistent vomiting was diagnosed and recommendation was made for salt tabs 1g TID and surveillance of serum sodium and mental status.      On the day of discharge the patient was stable and afebrile, voiding spontaneously, tolerating regular diet, ambulating at baseline and had pain well controlled with oral pain medications.  Patient to have close follow up with Dr. Harrington outpatient. 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  .  The patient was extubated and awoken in the OR and taken to the PACU in stable condition with PCA for pain and HSQ for VTE prophylaxis.  On POD1 the patient was transitioned to IV and PO pain meds with good pain control.  She was started on lovenox for VTE prophylaxis; she requires home lovenox in the setting of hypercoagulable state due to malignancy.  She was able to tolerate regular diet,  ayoub catheter was removed and she voided spontaneously, and she was able to ambulate with assistance.  She was seen by Physical therapy who recommended rolling walker, ambulation with assistance and transfer to rehab facility on discharge.  On POD2 she was tranfused 1 unit pRBC for symptomatic anemia with low grade tachycardia; Hct Trend: 37.8->28.4->28.7->23.9->1u pRBC->29.5->32.0.  She was transitioned to PO pain meds and had good pain control.  Patient had persistent nausea on POD4-7.  She was given rectal suppository on POD6 and had bowel movements.  Nausea was controlled with IV anteemetics.  On POD7 the patient had hyponatremia.  Nephrology consult was called; hypo-osmolar hyponatremia secondary to elevated ADH release in the setting of persistent vomiting was diagnosed and recommendation was made for salt tabs 1g TID and surveillance of serum sodium and mental status.  Hyponatremia and nausea resolved over POD7-11.   On the day of discharge to rehab the patient was stable and afebrile, voiding spontaneously, tolerating regular diet, ambulating at baseline and had pain well controlled with oral pain medications.  Patient to have close follow up with Dr. Harrington outpatient. 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  .  The patient was extubated and awoken in the OR and taken to the PACU in stable condition with PCA for pain and HSQ for VTE prophylaxis.  On POD1 the patient was transitioned to IV and PO pain meds with good pain control.  She was started on lovenox for VTE prophylaxis; she requires home lovenox in the setting of hypercoagulable state due to malignancy.  She was able to tolerate regular diet,  ayoub catheter was removed and she voided spontaneously, and she was able to ambulate with assistance.  She was seen by Physical therapy who recommended rolling walker, ambulation with assistance and transfer to rehab facility on discharge.  On POD2 she was tranfused 1 unit pRBC for symptomatic anemia with low grade tachycardia; Hct Trend: 37.8->28.4->28.7->23.9->1u pRBC->29.5->32.0.  She was transitioned to PO pain meds and had good pain control.  Patient had persistent nausea on POD4-7.  She was given rectal suppository on POD6 and had bowel movements.  Nausea was controlled with IV anteemetics.  On POD7 the patient had hyponatremia.  Nephrology consult was called; hypo-osmolar hyponatremia secondary to elevated ADH release in the setting of persistent vomiting was diagnosed and recommendation was made for salt tabs 1g TID and surveillance of serum sodium and mental status.  Hyponatremia and nausea resolved over POD#11.   On the day of discharge to rehab the patient was stable and afebrile, voiding spontaneously, tolerating regular diet, ambulating at baseline and had pain well controlled with oral pain medications.  Patient to have close follow up with Dr. Harrington outpatient. Pt to have repeat BMP next Friday 11/9/18 to follow up sodium level as per renal. 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  .  The patient was extubated and awoken in the OR and taken to the PACU in stable condition with PCA for pain and HSQ for VTE prophylaxis.  On POD1 the patient was transitioned to IV and PO pain meds with good pain control.  She was started on lovenox for VTE prophylaxis; she requires home lovenox in the setting of hypercoagulable state due to malignancy.  She was able to tolerate regular diet,  ayoub catheter was removed and she voided spontaneously, and she was able to ambulate with assistance.  She was seen by Physical therapy who recommended rolling walker, ambulation with assistance and transfer to rehab facility on discharge.  On POD2 she was tranfused 1 unit pRBC for symptomatic anemia with mild tachycardia; Hct Trend: 37.8->28.4->28.7->23.9->1u pRBC->29.5->32.0.  She was transitioned to PO pain meds and had good pain control.  Patient had persistent nausea on POD4-7.  She was given rectal suppository on POD6 and had bowel movements.  Nausea was controlled with IV anteemetics.  On POD7 the patient had hyponatremia.  Nephrology consult was called; hypo-osmolar hyponatremia secondary to elevated ADH release in the setting of persistent vomiting was diagnosed and recommendation was made for salt tabs 1g TID and surveillance of serum sodium and mental status.  Hyponatremia and nausea resolved over POD#11.   On the day of discharge to rehab the patient was stable and afebrile, voiding spontaneously, tolerating regular diet, ambulating at baseline and had pain well controlled with oral pain medications.  Patient to have close follow up with Dr. Harrington outpatient. Pt to have repeat BMP next Friday 11/9/18 to follow up sodium level as per renal.

## 2018-10-24 NOTE — DISCHARGE NOTE ADULT - ADDITIONAL INSTRUCTIONS
Please follow up with Dr. Harrington on 11/2/18 at 4pm for your postop checkup. Please follow up with Dr. Harrington on 11/2/18 at 4pm for your postop checkup.  Please Draw BMP next Friday on 11/9/18 to follow up NA level. Please follow up with Dr. Harrington on 11/2/18 at 4pm for your postop checkup.  Please Draw BMP next Friday on 11/9/18 to follow up Sodium level. Please Draw BMP next Friday on 11/9/18 to follow up Sodium level.

## 2018-10-25 LAB
GLUCOSE BLDC GLUCOMTR-MCNC: 157 MG/DL — HIGH (ref 70–99)
GLUCOSE BLDC GLUCOMTR-MCNC: 198 MG/DL — HIGH (ref 70–99)
GLUCOSE BLDC GLUCOMTR-MCNC: 222 MG/DL — HIGH (ref 70–99)
GLUCOSE BLDC GLUCOMTR-MCNC: 244 MG/DL — HIGH (ref 70–99)

## 2018-10-25 RX ORDER — ONDANSETRON 8 MG/1
4 TABLET, FILM COATED ORAL EVERY 6 HOURS
Qty: 0 | Refills: 0 | Status: DISCONTINUED | OUTPATIENT
Start: 2018-10-25 | End: 2018-10-26

## 2018-10-25 RX ORDER — METOPROLOL TARTRATE 50 MG
5 TABLET ORAL ONCE
Qty: 0 | Refills: 0 | Status: COMPLETED | OUTPATIENT
Start: 2018-10-25 | End: 2018-10-25

## 2018-10-25 RX ADMIN — Medication 500 MILLIGRAM(S): at 21:13

## 2018-10-25 RX ADMIN — FAMOTIDINE 20 MILLIGRAM(S): 10 INJECTION INTRAVENOUS at 12:28

## 2018-10-25 RX ADMIN — Medication 2: at 08:59

## 2018-10-25 RX ADMIN — SIMETHICONE 80 MILLIGRAM(S): 80 TABLET, CHEWABLE ORAL at 21:13

## 2018-10-25 RX ADMIN — Medication 100 MILLIGRAM(S): at 17:34

## 2018-10-25 RX ADMIN — ENOXAPARIN SODIUM 40 MILLIGRAM(S): 100 INJECTION SUBCUTANEOUS at 12:22

## 2018-10-25 RX ADMIN — Medication 650 MILLIGRAM(S): at 18:11

## 2018-10-25 RX ADMIN — Medication 650 MILLIGRAM(S): at 00:42

## 2018-10-25 RX ADMIN — Medication 2: at 17:35

## 2018-10-25 RX ADMIN — Medication 5 MILLIGRAM(S): at 17:37

## 2018-10-25 RX ADMIN — ONDANSETRON 4 MILLIGRAM(S): 8 TABLET, FILM COATED ORAL at 05:32

## 2018-10-25 RX ADMIN — Medication 650 MILLIGRAM(S): at 01:42

## 2018-10-25 RX ADMIN — LISINOPRIL 20 MILLIGRAM(S): 2.5 TABLET ORAL at 05:20

## 2018-10-25 RX ADMIN — Medication 650 MILLIGRAM(S): at 23:55

## 2018-10-25 RX ADMIN — Medication 500 MILLIGRAM(S): at 22:10

## 2018-10-25 RX ADMIN — Medication 10 MILLIGRAM(S): at 08:59

## 2018-10-25 RX ADMIN — Medication 5 MILLIGRAM(S): at 06:51

## 2018-10-25 RX ADMIN — Medication 4: at 12:20

## 2018-10-25 RX ADMIN — Medication 650 MILLIGRAM(S): at 11:30

## 2018-10-25 RX ADMIN — Medication 650 MILLIGRAM(S): at 10:48

## 2018-10-25 RX ADMIN — Medication 650 MILLIGRAM(S): at 17:34

## 2018-10-25 RX ADMIN — Medication 500 MILLIGRAM(S): at 09:08

## 2018-10-25 RX ADMIN — Medication 500 MILLIGRAM(S): at 09:45

## 2018-10-25 NOTE — CHART NOTE - NSCHARTNOTEFT_GEN_A_CORE
Patient seen and examined at bedside. Patient feels abdominal discomfort and nausea after eating, but no episodes of emesis. Passing flatus. Otherwise no acute complaints. Pain well controlled. Denies CP, SOB, N/V, fevers, and chills.    Vital Signs Last 24 Hours  T(C): 36.6 (10-25-18 @ 17:11), Max: 37.1 (10-24-18 @ 22:01)  HR: 97 (10-25-18 @ 17:11) (97 - 107)  BP: 156/68 (10-25-18 @ 17:11) (145/51 - 177/71)  RR: 18 (10-25-18 @ 17:11) (16 - 18)  SpO2: 96% (10-25-18 @ 17:11) (94% - 100%)    I&O's Summary    24 Oct 2018 07:01  -  25 Oct 2018 07:00  --------------------------------------------------------  IN: 0 mL / OUT: 1170 mL / NET: -1170 mL    25 Oct 2018 07:01  -  25 Oct 2018 20:00  --------------------------------------------------------  IN: 0 mL / OUT: 600 mL / NET: -600 mL        Physical Exam:  General: NAD  Abdomen: Soft, distended, mild tenderness    Labs:             9.5    x     )-----------( x        ( 10-24 @ 16:20 )             29.8                7.4    8.78  )-----------( 486      ( 10-24 @ 03:57 )             23.9                8.6    14.28 )-----------( 622      ( 10-23 @ 05:45 )             28.7                8.5    16.36 )-----------( 532      ( 10-22 @ 20:25 )             28.4         MEDICATIONS  (STANDING):  acetaminophen   Tablet .. 650 milliGRAM(s) Oral every 6 hours  dextrose 5%. 1000 milliLiter(s) (50 mL/Hr) IV Continuous <Continuous>  dextrose 50% Injectable 25 Gram(s) IV Push once  docusate sodium 100 milliGRAM(s) Oral two times a day  enoxaparin Injectable 40 milliGRAM(s) SubCutaneous daily  famotidine    Tablet 20 milliGRAM(s) Oral daily  glipiZIDE 10 milliGRAM(s) Oral <User Schedule>  glipiZIDE 5 milliGRAM(s) Oral <User Schedule>  insulin lispro (HumaLOG) corrective regimen sliding scale   SubCutaneous three times a day before meals  insulin lispro (HumaLOG) corrective regimen sliding scale   SubCutaneous at bedtime  lisinopril 20 milliGRAM(s) Oral daily  naproxen 500 milliGRAM(s) Oral every 12 hours  senna 2 Tablet(s) Oral at bedtime    MEDICATIONS  (PRN):  dextrose 40% Gel 15 Gram(s) Oral once PRN Blood Glucose LESS THAN 70 milliGRAM(s)/deciliter  glucagon  Injectable 1 milliGRAM(s) IntraMuscular once PRN Glucose LESS THAN 70 milligrams/deciliter  ondansetron Injectable 4 milliGRAM(s) IV Push every 6 hours PRN Nausea and/or Vomiting  simethicone 80 milliGRAM(s) Chew every 8 hours PRN Gas  traMADol 25 milliGRAM(s) Oral every 6 hours PRN Moderate Pain (4 - 6)    78y F admitted w/ pelvic mass now POD3 s/p Ex-lap, FEDERICO, BSO, resection of pelvic mass, PPALND, omentectomy and 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 Today she is tolerating PO and continues to pass gas       - continue regular diet as tolerated  - continue to monitor GI function  - consider eval for D/C tomorrow if no clinical signs of ileus  - no am labs    DONI Spencer PGY-3

## 2018-10-25 NOTE — PROGRESS NOTE ADULT - ASSESSMENT
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  Patient was tranfused 1unit yesterday for symptomatic anemia with appropriate rise in H/H.  Overnight she did not tolerate ADA regular diet; she had yellow emesis x1 and her nausea improved with zofran.  She continues to pass gas, ambulate at baseline, and void spontaneously.

## 2018-10-25 NOTE — PROGRESS NOTE ADULT - SUBJECTIVE AND OBJECTIVE BOX
R2 GYN ONC PRORESS NOTE    POD#3   HD#4    SUBJECTIVE:  Patient seen and examined at bedside.  Overnight the patient had vomiting x1 of approx 20cc of yellow emesis.  She was given zofran and her nausea improved.  This morning she feels better, is passing flatus, reports pain well controlled, is ambulating w/ assistance at baseline and is voiding spontaneously.  Denies CP, SOB, N/V, fevers, and chills.    OBJECTIVE:  Vital Signs Last 24 Hours  T(C): 36.6 (10-25-18 @ 05:14), Max: 37.1 (10-24-18 @ 22:01)  HR: 102 (10-25-18 @ 05:14) (100 - 107)  BP: 177/71 (10-25-18 @ 05:14) (147/57 - 177/71)  RR: 18 (10-25-18 @ 05:14) (16 - 18)  SpO2: 95% (10-25-18 @ 05:14) (94% - 96%)    I&O's Summary    23 Oct 2018 07:01  -  24 Oct 2018 07:00  --------------------------------------------------------  IN: 0 mL / OUT: 975 mL / NET: -975 mL    24 Oct 2018 07:01  -  25 Oct 2018 06:24  --------------------------------------------------------  IN: 0 mL / OUT: 1150 mL / NET: -1150 mL    Physical Exam:  General: NAD  CV: NR, RR, S1, S2, no M/R/G  Lungs: CTA-B  Abdomen: Soft, mildy tender, mildly distended, +BS  Incision: midline vertical c/d/i w/ steris  Ext: No pain or swelling    Labs:                     9.5       )-----------(        ( 24 Oct 2018 16:20 )          29.8                             7.4    8.78  )-----------( 486      ( 24 Oct 2018 03:57 )             23.9   baso 0.2    eos 0.6    imm gran 0.6    lymph 9.0    mono 6.7    poly 82.9                         8.6    14.28 )-----------( 622      ( 23 Oct 2018 05:45 )             28.7   baso 0.1    eos 0.0    imm gran 0.4    lymph 5.3    mono 7.1    poly 87.1                         8.5    16.36 )-----------( 532      ( 22 Oct 2018 20:25 )             28.4   baso 0.1    eos 0.0    imm gran 0.3    lymph 2.1    mono 3.9    poly 93.6     10-24    129<L>  |  x   |  x   ----------------------------<  x   x    |  x   |  x     Ca    7.3<L>      24 Oct 2018 03:57  Phos  1.6     10-24  Mg     1.9     10-24    Blood Type: O Positive    MEDICATIONS  (STANDING):  acetaminophen   Tablet .. 650 milliGRAM(s) Oral every 6 hours  dextrose 5%. 1000 milliLiter(s) (50 mL/Hr) IV Continuous <Continuous>  dextrose 50% Injectable 25 Gram(s) IV Push once  docusate sodium 100 milliGRAM(s) Oral two times a day  enoxaparin Injectable 40 milliGRAM(s) SubCutaneous daily  famotidine    Tablet 20 milliGRAM(s) Oral daily  glipiZIDE 10 milliGRAM(s) Oral <User Schedule>  glipiZIDE 5 milliGRAM(s) Oral <User Schedule>  insulin lispro (HumaLOG) corrective regimen sliding scale   SubCutaneous three times a day before meals  insulin lispro (HumaLOG) corrective regimen sliding scale   SubCutaneous at bedtime  lisinopril 20 milliGRAM(s) Oral daily  naproxen 500 milliGRAM(s) Oral every 12 hours  senna 2 Tablet(s) Oral at bedtime    MEDICATIONS  (PRN):  dextrose 40% Gel 15 Gram(s) Oral once PRN Blood Glucose LESS THAN 70 milliGRAM(s)/deciliter  glucagon  Injectable 1 milliGRAM(s) IntraMuscular once PRN Glucose LESS THAN 70 milligrams/deciliter  ondansetron Injectable 4 milliGRAM(s) IV Push every 6 hours PRN Nausea and/or Vomiting  simethicone 80 milliGRAM(s) Chew every 8 hours PRN Gas  traMADol 25 milliGRAM(s) Oral every 6 hours PRN Moderate Pain (4 - 6)

## 2018-10-25 NOTE — PROGRESS NOTE ADULT - PROBLEM SELECTOR PLAN 1
NEURO: continue tylenol po, toradol, tramadol for pain  CV: low grade tachy; s/p 1uPRBC with appropriate rise in H&H  PULM: saturating well on RA; increase incentive spirometry  GI: ADA clears; c/w pepcid, mylicon, zofran prn  : voiding spontaneously; continue strict I/Os  HEME: c/w Lovenox; pt will need home lovenox for hypercoagulable state in setting of malignancy.  c/w venodynes and increase ambulation w/ assistance  ENDO: c/w FSG; continue ISS increased at moderate; continue home glipizide  ID: afebrile  DISPO: continue routine postop care    YAIMA Holman MD PGY2 NEURO: continue tylenol po, toradol, tramadol for pain  CV: low grade tachy; s/p 1uPRBC with appropriate rise in H&H; HTN this am after she vomited up her lisinopril: 1x lopressor then return to normal home anti-htn meds  PULM: saturating well on RA; increase incentive spirometry  GI: ADA Reg; c/w pepcid, mylicon, zofran prn  : voiding spontaneously; continue strict I/Os  HEME: c/w Lovenox; pt will need home lovenox for hypercoagulable state in setting of malignancy.  c/w venodynes and increase ambulation w/ assistance  ENDO: c/w FSG; continue ISS increased at moderate; continue home glipizide  ID: afebrile  DISPO: continue routine postop care    YAIMA Holman MD PGY2

## 2018-10-26 LAB
GLUCOSE BLDC GLUCOMTR-MCNC: 147 MG/DL — HIGH (ref 70–99)
GLUCOSE BLDC GLUCOMTR-MCNC: 192 MG/DL — HIGH (ref 70–99)
GLUCOSE BLDC GLUCOMTR-MCNC: 195 MG/DL — HIGH (ref 70–99)
GLUCOSE BLDC GLUCOMTR-MCNC: 234 MG/DL — HIGH (ref 70–99)

## 2018-10-26 RX ORDER — SIMETHICONE 80 MG/1
80 TABLET, CHEWABLE ORAL EVERY 6 HOURS
Qty: 0 | Refills: 0 | Status: DISCONTINUED | OUTPATIENT
Start: 2018-10-26 | End: 2018-11-02

## 2018-10-26 RX ORDER — SIMETHICONE 80 MG/1
80 TABLET, CHEWABLE ORAL
Qty: 0 | Refills: 0 | Status: DISCONTINUED | OUTPATIENT
Start: 2018-10-26 | End: 2018-10-26

## 2018-10-26 RX ORDER — POLYETHYLENE GLYCOL 3350 17 G/17G
17 POWDER, FOR SOLUTION ORAL ONCE
Qty: 0 | Refills: 0 | Status: COMPLETED | OUTPATIENT
Start: 2018-10-26 | End: 2018-10-26

## 2018-10-26 RX ORDER — KETOROLAC TROMETHAMINE 30 MG/ML
15 SYRINGE (ML) INJECTION EVERY 8 HOURS
Qty: 0 | Refills: 0 | Status: DISCONTINUED | OUTPATIENT
Start: 2018-10-26 | End: 2018-10-28

## 2018-10-26 RX ORDER — ACETAMINOPHEN 500 MG
1000 TABLET ORAL ONCE
Qty: 0 | Refills: 0 | Status: COMPLETED | OUTPATIENT
Start: 2018-10-26 | End: 2018-10-26

## 2018-10-26 RX ADMIN — LISINOPRIL 20 MILLIGRAM(S): 2.5 TABLET ORAL at 06:45

## 2018-10-26 RX ADMIN — POLYETHYLENE GLYCOL 3350 17 GRAM(S): 17 POWDER, FOR SOLUTION ORAL at 12:11

## 2018-10-26 RX ADMIN — Medication 100 MILLIGRAM(S): at 17:43

## 2018-10-26 RX ADMIN — SIMETHICONE 80 MILLIGRAM(S): 80 TABLET, CHEWABLE ORAL at 08:30

## 2018-10-26 RX ADMIN — Medication 4: at 12:10

## 2018-10-26 RX ADMIN — SIMETHICONE 80 MILLIGRAM(S): 80 TABLET, CHEWABLE ORAL at 17:43

## 2018-10-26 RX ADMIN — Medication 100 MILLIGRAM(S): at 06:45

## 2018-10-26 RX ADMIN — Medication 650 MILLIGRAM(S): at 00:55

## 2018-10-26 RX ADMIN — Medication 10 MILLIGRAM(S): at 08:30

## 2018-10-26 RX ADMIN — SIMETHICONE 80 MILLIGRAM(S): 80 TABLET, CHEWABLE ORAL at 12:11

## 2018-10-26 RX ADMIN — ENOXAPARIN SODIUM 40 MILLIGRAM(S): 100 INJECTION SUBCUTANEOUS at 12:11

## 2018-10-26 RX ADMIN — Medication 5 MILLIGRAM(S): at 17:42

## 2018-10-26 RX ADMIN — FAMOTIDINE 20 MILLIGRAM(S): 10 INJECTION INTRAVENOUS at 12:11

## 2018-10-26 RX ADMIN — Medication 500 MILLIGRAM(S): at 08:30

## 2018-10-26 RX ADMIN — ONDANSETRON 4 MILLIGRAM(S): 8 TABLET, FILM COATED ORAL at 07:49

## 2018-10-26 RX ADMIN — Medication 400 MILLIGRAM(S): at 07:44

## 2018-10-26 RX ADMIN — Medication 2: at 17:41

## 2018-10-26 NOTE — PROGRESS NOTE ADULT - PROBLEM SELECTOR PLAN 1
NEURO: continue tylenol po, naproxen, tramadol for pain  CV: s/p 1uPRBC with appropriate rise in H&H on POD2; continue home anti-htn meds; f/u AM CBC  PULM: saturating well on RA; increase incentive spirometry  GI: ADA Reg; c/w pepcid, mylicon standing, zofran prn  : voiding spontaneously; continue strict I/Os  HEME: c/w Lovenox; pt will need home lovenox for hypercoagulable state in setting of malignancy.  c/w venodynes and increase ambulation w/ assistance  ENDO: c/w FSG; continue ISS moderate; continue home glipizide  ID: afebrile  DISPO: continue routine postop care    YAIMA Holman MD PGY2.

## 2018-10-26 NOTE — PROGRESS NOTE ADULT - ASSESSMENT
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  Patient was tranfused 1unit yesterday for symptomatic anemia with appropriate rise in H/H.  Overnight she did not tolerate ADA regular diet; she had yellow emesis x1 and her nausea improved with zofran.  She continues to pass gas, ambulate at baseline, and void spontaneously.

## 2018-10-26 NOTE — PROGRESS NOTE ADULT - SUBJECTIVE AND OBJECTIVE BOX
R2 GYN ONC PROGRESS NOTE    POD#4   HD#5    SUBJECTIVE:  Patient seen and examined at bedside, no acute overnight events. This morning the pt complains of severe gas pain on the right side and epigastrum.  She also reports nausea w/o vomiting; she says this kept her from eating much dinner.  She is ambulating, passing flatus, voiding spontaneously. Denies CP, SOB, N/V, fevers, and chills.    OBJECTIVE:  Vital Signs Last 24 Hours  T(C): 36.4 (10-26-18 @ 05:01), Max: 36.7 (10-25-18 @ 10:20)  HR: 97 (10-26-18 @ 05:01) (97 - 107)  BP: 154/67 (10-26-18 @ 05:01) (145/51 - 156/68)  RR: 18 (10-26-18 @ 05:01) (18 - 18)  SpO2: 95% (10-26-18 @ 05:01) (94% - 100%)    I&O's Summary    24 Oct 2018 07:01  -  25 Oct 2018 07:00  --------------------------------------------------------  IN: 0 mL / OUT: 1170 mL / NET: -1170 mL    25 Oct 2018 07:01  -  26 Oct 2018 06:33  --------------------------------------------------------  IN: 0 mL / OUT: 1300 mL / NET: -1300 mL      Physical Exam:  General: NAD  CV: NR, RR, S1, S2, no M/R/G  Lungs: CTA-B  Abdomen: Soft, tender over the right abdomen, moderately distended and tympanic, loud BS over upper quadrants, normal BS over lower quadrants  Incision: midline vertical c/d/i  Ext: No pain or swelling    Labs:                        9.5    x )-----------( x        ( 24 Oct 2018 16:20 )             29.8                           7.4    8.78  )-----------( 486      ( 24 Oct 2018 03:57 )             23.9   baso 0.2    eos 0.6    imm gran 0.6    lymph 9.0    mono 6.7    poly 82.9     10-24    129<L>  |  x   |  x   ----------------------------<  x   x    |  x   |  x     Blood Type: O Positive    MEDICATIONS  (STANDING):  acetaminophen   Tablet .. 650 milliGRAM(s) Oral every 6 hours  dextrose 5%. 1000 milliLiter(s) (50 mL/Hr) IV Continuous <Continuous>  dextrose 50% Injectable 25 Gram(s) IV Push once  docusate sodium 100 milliGRAM(s) Oral two times a day  enoxaparin Injectable 40 milliGRAM(s) SubCutaneous daily  famotidine    Tablet 20 milliGRAM(s) Oral daily  glipiZIDE 10 milliGRAM(s) Oral <User Schedule>  glipiZIDE 5 milliGRAM(s) Oral <User Schedule>  insulin lispro (HumaLOG) corrective regimen sliding scale   SubCutaneous three times a day before meals  insulin lispro (HumaLOG) corrective regimen sliding scale   SubCutaneous at bedtime  lisinopril 20 milliGRAM(s) Oral daily  naproxen 500 milliGRAM(s) Oral every 12 hours  senna 2 Tablet(s) Oral at bedtime    MEDICATIONS  (PRN):  dextrose 40% Gel 15 Gram(s) Oral once PRN Blood Glucose LESS THAN 70 milliGRAM(s)/deciliter  glucagon  Injectable 1 milliGRAM(s) IntraMuscular once PRN Glucose LESS THAN 70 milligrams/deciliter  ondansetron Injectable 4 milliGRAM(s) IV Push every 6 hours PRN Nausea and/or Vomiting  simethicone 80 milliGRAM(s) Chew every 8 hours PRN Gas  traMADol 25 milliGRAM(s) Oral every 6 hours PRN Moderate Pain (4 - 6)

## 2018-10-27 LAB
BUN SERPL-MCNC: 8 MG/DL — SIGNIFICANT CHANGE UP (ref 7–23)
CALCIUM SERPL-MCNC: 8.1 MG/DL — LOW (ref 8.4–10.5)
CHLORIDE SERPL-SCNC: 97 MMOL/L — LOW (ref 98–107)
CO2 SERPL-SCNC: 24 MMOL/L — SIGNIFICANT CHANGE UP (ref 22–31)
CREAT SERPL-MCNC: 0.38 MG/DL — LOW (ref 0.5–1.3)
GLUCOSE BLDC GLUCOMTR-MCNC: 141 MG/DL — HIGH (ref 70–99)
GLUCOSE BLDC GLUCOMTR-MCNC: 153 MG/DL — HIGH (ref 70–99)
GLUCOSE BLDC GLUCOMTR-MCNC: 155 MG/DL — HIGH (ref 70–99)
GLUCOSE BLDC GLUCOMTR-MCNC: 165 MG/DL — HIGH (ref 70–99)
GLUCOSE SERPL-MCNC: 147 MG/DL — HIGH (ref 70–99)
POTASSIUM SERPL-MCNC: 4.4 MMOL/L — SIGNIFICANT CHANGE UP (ref 3.5–5.3)
POTASSIUM SERPL-SCNC: 4.4 MMOL/L — SIGNIFICANT CHANGE UP (ref 3.5–5.3)
SODIUM SERPL-SCNC: 133 MMOL/L — LOW (ref 135–145)

## 2018-10-27 RX ORDER — METOCLOPRAMIDE HCL 10 MG
10 TABLET ORAL ONCE
Qty: 0 | Refills: 0 | Status: COMPLETED | OUTPATIENT
Start: 2018-10-27 | End: 2018-10-27

## 2018-10-27 RX ORDER — ONDANSETRON 8 MG/1
4 TABLET, FILM COATED ORAL ONCE
Qty: 0 | Refills: 0 | Status: COMPLETED | OUTPATIENT
Start: 2018-10-27 | End: 2018-10-27

## 2018-10-27 RX ADMIN — ENOXAPARIN SODIUM 40 MILLIGRAM(S): 100 INJECTION SUBCUTANEOUS at 12:44

## 2018-10-27 RX ADMIN — Medication 2: at 17:18

## 2018-10-27 RX ADMIN — Medication 100 MILLIGRAM(S): at 05:03

## 2018-10-27 RX ADMIN — Medication 15 MILLIGRAM(S): at 04:32

## 2018-10-27 RX ADMIN — SENNA PLUS 2 TABLET(S): 8.6 TABLET ORAL at 20:54

## 2018-10-27 RX ADMIN — Medication 15 MILLIGRAM(S): at 05:00

## 2018-10-27 RX ADMIN — Medication 5 MILLIGRAM(S): at 17:19

## 2018-10-27 RX ADMIN — ONDANSETRON 4 MILLIGRAM(S): 8 TABLET, FILM COATED ORAL at 13:34

## 2018-10-27 RX ADMIN — LISINOPRIL 20 MILLIGRAM(S): 2.5 TABLET ORAL at 05:03

## 2018-10-27 RX ADMIN — ONDANSETRON 4 MILLIGRAM(S): 8 TABLET, FILM COATED ORAL at 02:53

## 2018-10-27 RX ADMIN — Medication 100 MILLIGRAM(S): at 17:20

## 2018-10-27 RX ADMIN — Medication 2: at 08:41

## 2018-10-27 RX ADMIN — ONDANSETRON 4 MILLIGRAM(S): 8 TABLET, FILM COATED ORAL at 20:06

## 2018-10-27 RX ADMIN — Medication 10 MILLIGRAM(S): at 08:41

## 2018-10-27 RX ADMIN — Medication 10 MILLIGRAM(S): at 20:53

## 2018-10-27 RX ADMIN — Medication 650 MILLIGRAM(S): at 17:18

## 2018-10-27 NOTE — PROGRESS NOTE ADULT - SUBJECTIVE AND OBJECTIVE BOX
[facility] Progress Note POD #5    Pt seen, examined at bedside and doing well meeting all post operative milestones. Pt states mild abdominal pain.  Pt denies fever, chills, chest pain, SOB, nausea, vomiting, lightheadedness, dizziness.  Pt states passing flatus,    T(F): 98.1 (10-27-18 @ 05:00), Max: 98.5 (10-26-18 @ 14:38)  HR: 99 (10-27-18 @ 05:00) (94 - 101)  BP: 167/79 (10-27-18 @ 05:00) (149/66 - 180/76)  RR: 17 (10-27-18 @ 05:00) (17 - 18)  SpO2: 95% (10-27-18 @ 05:00) (94% - 96%)  Wt(kg): --  CAPILLARY BLOOD GLUCOSE    I&O's Detail    25 Oct 2018 07:01  -  26 Oct 2018 07:00  --------------------------------------------------------  IN:  Total IN: 0 mL    OUT:    Voided: 1700 mL  Total OUT: 1700 mL    Total NET: -1700 mL      26 Oct 2018 07:01  -  27 Oct 2018 05:42  --------------------------------------------------------  IN:  Total IN: 0 mL    OUT:    Voided: 1550 mL  Total OUT: 1550 mL    Total NET: -1550 mL          MEDICATIONS  (STANDING):  acetaminophen   Tablet .. 650 milliGRAM(s) Oral every 6 hours  dextrose 5%. 1000 milliLiter(s) (50 mL/Hr) IV Continuous <Continuous>  dextrose 50% Injectable 25 Gram(s) IV Push once  docusate sodium 100 milliGRAM(s) Oral two times a day  enoxaparin Injectable 40 milliGRAM(s) SubCutaneous daily  glipiZIDE 10 milliGRAM(s) Oral <User Schedule>  glipiZIDE 5 milliGRAM(s) Oral <User Schedule>  insulin lispro (HumaLOG) corrective regimen sliding scale   SubCutaneous three times a day before meals  insulin lispro (HumaLOG) corrective regimen sliding scale   SubCutaneous at bedtime  ketorolac   Injectable 15 milliGRAM(s) IV Push every 8 hours  lisinopril 20 milliGRAM(s) Oral daily  senna 2 Tablet(s) Oral at bedtime    MEDICATIONS  (PRN):  dextrose 40% Gel 15 Gram(s) Oral once PRN Blood Glucose LESS THAN 70 milliGRAM(s)/deciliter  glucagon  Injectable 1 milliGRAM(s) IntraMuscular once PRN Glucose LESS THAN 70 milligrams/deciliter  simethicone 80 milliGRAM(s) Chew every 6 hours PRN Gas  traMADol 25 milliGRAM(s) Oral every 6 hours PRN Moderate Pain (4 - 6)      Physical Exam:  Constitutional: WDWN female, NAD AxOx3  Skin: no breakdowns noted, warm and dry  Chest: s1s2+, RRR, clear to auscultation bilaterally, no w/r/r    Abdomen: softly distended, no guarding, no rebound, [] bowel sounds, appropriate tenderness noted   Incision site:   vertical incision clean and dry with []intact.    Extremities: no lower extremity edema or calf tenderness bilaterally; intermittent compression stockings in place     LABS:             9.5    x     )-----------( x        ( 10-24 @ 16:20 )             29.8                       RADIOLOGY & ADDITIONAL TESTS:    a/p: This 78y female, s/p     CV: hemodynamically stable, H/H []  PUL: adequate on RA  GI:   :   [] adequate output without dysuria  ID: afebrile, WBC stable  DVT prophylaxis:   Pain Management: controlled on []  d/w   []  on morning rounds  -encourage ambulation  -encourage incentive spirometry  -start d/c planning  continue with current care    DEBORAH Thakkar  #[id number] [facility] Progress Note POD #5    Pt seen, examined at bedside and doing well meeting all post operative milestones. Pt states mild abdominal pain. Pt states she had some nausea overnight that was relived with Zofran.  Pt denies fever, chills, chest pain, SOB, vomiting, lightheadedness, dizziness.  Pt states passing flatus,    T(F): 98.1 (10-27-18 @ 05:00), Max: 98.5 (10-26-18 @ 14:38)  HR: 99 (10-27-18 @ 05:00) (94 - 101)  BP: 167/79 (10-27-18 @ 05:00) (149/66 - 180/76)  RR: 17 (10-27-18 @ 05:00) (17 - 18)  SpO2: 95% (10-27-18 @ 05:00) (94% - 96%)  Wt(kg): --  CAPILLARY BLOOD GLUCOSE    I&O's Detail    25 Oct 2018 07:01  -  26 Oct 2018 07:00  --------------------------------------------------------  IN:  Total IN: 0 mL    OUT:    Voided: 1700 mL  Total OUT: 1700 mL    Total NET: -1700 mL      26 Oct 2018 07:01  -  27 Oct 2018 05:42  --------------------------------------------------------  IN:  Total IN: 0 mL    OUT:    Voided: 1550 mL  Total OUT: 1550 mL    Total NET: -1550 mL      MEDICATIONS  (STANDING):  acetaminophen   Tablet .. 650 milliGRAM(s) Oral every 6 hours  dextrose 5%. 1000 milliLiter(s) (50 mL/Hr) IV Continuous <Continuous>  dextrose 50% Injectable 25 Gram(s) IV Push once  docusate sodium 100 milliGRAM(s) Oral two times a day  enoxaparin Injectable 40 milliGRAM(s) SubCutaneous daily  glipiZIDE 10 milliGRAM(s) Oral <User Schedule>  glipiZIDE 5 milliGRAM(s) Oral <User Schedule>  insulin lispro (HumaLOG) corrective regimen sliding scale   SubCutaneous three times a day before meals  insulin lispro (HumaLOG) corrective regimen sliding scale   SubCutaneous at bedtime  ketorolac   Injectable 15 milliGRAM(s) IV Push every 8 hours  lisinopril 20 milliGRAM(s) Oral daily  senna 2 Tablet(s) Oral at bedtime    MEDICATIONS  (PRN):  dextrose 40% Gel 15 Gram(s) Oral once PRN Blood Glucose LESS THAN 70 milliGRAM(s)/deciliter  glucagon  Injectable 1 milliGRAM(s) IntraMuscular once PRN Glucose LESS THAN 70 milligrams/deciliter  simethicone 80 milliGRAM(s) Chew every 6 hours PRN Gas  traMADol 25 milliGRAM(s) Oral every 6 hours PRN Moderate Pain (4 - 6)      Physical Exam:  Constitutional: WDWN female, NAD AxOx3  Skin: no breakdowns noted, warm and dry  Chest: s1s2+, RRR, clear to auscultation bilaterally, no w/r/r    Abdomen: softly distended, no guarding, no rebound, + bowel sounds, appropriate tenderness noted   Incision site:   vertical incision clean and dry with steri strips intact.  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 Has some nausea that was relieve with zofran, continue as needed  : voiding with adequate output without dysuria  ID: afebrile, WBC stable  DVT prophylaxis: Lovenox, which pt will continue at home for hypercoagulable state   Pain Management: controlled on current regimen, add IV tylenol as needed  d/w Dr. Hess on morning rounds  -encourage ambulation  -encourage incentive spirometry  -PT over weekend   -d/c planning for rehab facility on Monday 10/29  continue with current care    DEBORAH Thakkar  #[id number] [facility] Progress Note POD #5    Pt seen, examined at bedside and doing well meeting all post operative milestones. Pt states mild abdominal pain. Pt states she had some nausea overnight that was relived with Zofran.  Pt denies fever, chills, chest pain, SOB, vomiting, lightheadedness, dizziness.  Pt states passing flatus,    T(F): 98.1 (10-27-18 @ 05:00), Max: 98.5 (10-26-18 @ 14:38)  HR: 99 (10-27-18 @ 05:00) (94 - 101)  BP: 167/79 (10-27-18 @ 05:00) (149/66 - 180/76)  RR: 17 (10-27-18 @ 05:00) (17 - 18)  SpO2: 95% (10-27-18 @ 05:00) (94% - 96%)  Wt(kg): --  CAPILLARY BLOOD GLUCOSE    I&O's Detail    25 Oct 2018 07:01  -  26 Oct 2018 07:00  --------------------------------------------------------  IN:  Total IN: 0 mL    OUT:    Voided: 1700 mL  Total OUT: 1700 mL    Total NET: -1700 mL      26 Oct 2018 07:01  -  27 Oct 2018 05:42  --------------------------------------------------------  IN:  Total IN: 0 mL    OUT:    Voided: 1550 mL  Total OUT: 1550 mL    Total NET: -1550 mL      MEDICATIONS  (STANDING):  acetaminophen   Tablet .. 650 milliGRAM(s) Oral every 6 hours  dextrose 5%. 1000 milliLiter(s) (50 mL/Hr) IV Continuous <Continuous>  dextrose 50% Injectable 25 Gram(s) IV Push once  docusate sodium 100 milliGRAM(s) Oral two times a day  enoxaparin Injectable 40 milliGRAM(s) SubCutaneous daily  glipiZIDE 10 milliGRAM(s) Oral <User Schedule>  glipiZIDE 5 milliGRAM(s) Oral <User Schedule>  insulin lispro (HumaLOG) corrective regimen sliding scale   SubCutaneous three times a day before meals  insulin lispro (HumaLOG) corrective regimen sliding scale   SubCutaneous at bedtime  ketorolac   Injectable 15 milliGRAM(s) IV Push every 8 hours  lisinopril 20 milliGRAM(s) Oral daily  senna 2 Tablet(s) Oral at bedtime    MEDICATIONS  (PRN):  dextrose 40% Gel 15 Gram(s) Oral once PRN Blood Glucose LESS THAN 70 milliGRAM(s)/deciliter  glucagon  Injectable 1 milliGRAM(s) IntraMuscular once PRN Glucose LESS THAN 70 milligrams/deciliter  simethicone 80 milliGRAM(s) Chew every 6 hours PRN Gas  traMADol 25 milliGRAM(s) Oral every 6 hours PRN Moderate Pain (4 - 6)      Physical Exam:  Constitutional: WDWN female, NAD AxOx3  Skin: no breakdowns noted, warm and dry  Chest: s1s2+, RRR, clear to auscultation bilaterally, no w/r/r    Abdomen: softly distended, no guarding, no rebound, + bowel sounds, appropriate tenderness noted   Incision site:   vertical incision clean and dry with steri strips intact.  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 Has some nausea that was relieve with zofran, continue as needed  : voiding with adequate output without dysuria  ID: afebrile, WBC stable  DVT prophylaxis: Lovenox, which pt will continue at home for hypercoagulable state   Pain Management: controlled on current regimen, add IV tylenol as needed  d/w Dr. Hess on morning rounds  -encourage ambulation  -encourage incentive spirometry  -PT over weekend (spoke with weekend therapist at [phone], he will make sure pt is on weekend list)  -d/c planning for rehab facility on Monday 10/29  continue with current care    DEBORAH Thakkar  #[id number]

## 2018-10-27 NOTE — CHART NOTE - NSCHARTNOTEFT_GEN_A_CORE
R2 GYN ONC CHART NOTE    S: Pt's son Dallin ([phone]) called the service to report that he had been talking to his mother on the phone and that she has been having persistent nausea.  Pt seen and examined at bedside.  Pt reports she is having severe nausea despite prn zofran throughout the day.  She attributes this to her lack of BM since surgery.  She is passing flatus.  She is tolerating clear fluids, yogurt, few crackers and a bite of tuna.  She denies abd pain.  She walked 2x today with assistance.  Denies cp, sob, palpitations.  Pt's son was on the phone on speakerphone and took part in the discussion.    O:   ICU Vital Signs Last 24 Hrs  T(C): 36.7 (27 Oct 2018 17:21), Max: 36.9 (27 Oct 2018 14:09)  T(F): 98.1 (27 Oct 2018 17:21), Max: 98.4 (27 Oct 2018 14:09)  HR: 99 (27 Oct 2018 17:21) (94 - 104)  BP: 163/69 (27 Oct 2018 17:21) (148/76 - 167/79)  RR: 17 (27 Oct 2018 17:21) (17 - 18)  SpO2: 95% (27 Oct 2018 17:21) (92% - 95%)    Gen: R2 GYN ONC CHART NOTE    S: Pt's son Dallin ([phone]) called the service to report that he had been talking to his mother on the phone and that she has been having persistent nausea.  Pt seen and examined at bedside.  Pt reports she is having severe nausea despite prn zofran throughout the day.  She attributes this to her lack of BM since surgery.  She is passing flatus.  She is tolerating clear fluids, yogurt, few crackers and a bite of tuna.  She denies abd pain.  She walked 2x today with assistance.  Denies cp, sob, palpitations.  Pt's son was on the phone on speakerphone and took part in the discussion.    O:   ICU Vital Signs Last 24 Hrs  T(C): 36.7 (27 Oct 2018 17:21), Max: 36.9 (27 Oct 2018 14:09)  T(F): 98.1 (27 Oct 2018 17:21), Max: 98.4 (27 Oct 2018 14:09)  HR: 99 (27 Oct 2018 17:21) (94 - 104)  BP: 163/69 (27 Oct 2018 17:21) (148/76 - 167/79)  RR: 17 (27 Oct 2018 17:21) (17 - 18)  SpO2: 95% (27 Oct 2018 17:21) (92% - 95%)    Gen: NAD, A&Ox3  CV: rrr  Lungs: ctab  ABD: soft, increased distension, no tenderness, loud BS all 4 quadrants  Ext: wwp, venodynes in place    A/P: 78y F admitted w/ pelvic mass now POD5 s/p Ex-lap, FEDERICO, BSO, resection of pelvic mass, PPALND, omentectomy and 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  Pt w/ no relief of nausea by zofran.  She is passing flatus but has not had BM.     - reglan IVP for nausea   - continue routine postop care    d/w Dr. Rosa Elena Holman MD PGY2

## 2018-10-28 LAB
BASOPHILS # BLD AUTO: 0.04 K/UL — SIGNIFICANT CHANGE UP (ref 0–0.2)
BASOPHILS NFR BLD AUTO: 0.4 % — SIGNIFICANT CHANGE UP (ref 0–2)
BUN SERPL-MCNC: 7 MG/DL — SIGNIFICANT CHANGE UP (ref 7–23)
CALCIUM SERPL-MCNC: 8.1 MG/DL — LOW (ref 8.4–10.5)
CHLORIDE SERPL-SCNC: 89 MMOL/L — LOW (ref 98–107)
CO2 SERPL-SCNC: 22 MMOL/L — SIGNIFICANT CHANGE UP (ref 22–31)
CREAT SERPL-MCNC: 0.33 MG/DL — LOW (ref 0.5–1.3)
EOSINOPHIL # BLD AUTO: 0.11 K/UL — SIGNIFICANT CHANGE UP (ref 0–0.5)
EOSINOPHIL NFR BLD AUTO: 1 % — SIGNIFICANT CHANGE UP (ref 0–6)
GLUCOSE BLDC GLUCOMTR-MCNC: 113 MG/DL — HIGH (ref 70–99)
GLUCOSE BLDC GLUCOMTR-MCNC: 113 MG/DL — HIGH (ref 70–99)
GLUCOSE BLDC GLUCOMTR-MCNC: 164 MG/DL — HIGH (ref 70–99)
GLUCOSE BLDC GLUCOMTR-MCNC: 88 MG/DL — SIGNIFICANT CHANGE UP (ref 70–99)
GLUCOSE SERPL-MCNC: 154 MG/DL — HIGH (ref 70–99)
HCT VFR BLD CALC: 32 % — LOW (ref 34.5–45)
HGB BLD-MCNC: 10.1 G/DL — LOW (ref 11.5–15.5)
IMM GRANULOCYTES # BLD AUTO: 0.08 # — SIGNIFICANT CHANGE UP
IMM GRANULOCYTES NFR BLD AUTO: 0.7 % — SIGNIFICANT CHANGE UP (ref 0–1.5)
LYMPHOCYTES # BLD AUTO: 0.75 K/UL — LOW (ref 1–3.3)
LYMPHOCYTES # BLD AUTO: 6.6 % — LOW (ref 13–44)
MAGNESIUM SERPL-MCNC: 1.9 MG/DL — SIGNIFICANT CHANGE UP (ref 1.6–2.6)
MCHC RBC-ENTMCNC: 20.6 PG — LOW (ref 27–34)
MCHC RBC-ENTMCNC: 31.6 % — LOW (ref 32–36)
MCV RBC AUTO: 65.2 FL — LOW (ref 80–100)
MONOCYTES # BLD AUTO: 0.63 K/UL — SIGNIFICANT CHANGE UP (ref 0–0.9)
MONOCYTES NFR BLD AUTO: 5.6 % — SIGNIFICANT CHANGE UP (ref 2–14)
NEUTROPHILS # BLD AUTO: 9.74 K/UL — HIGH (ref 1.8–7.4)
NEUTROPHILS NFR BLD AUTO: 85.7 % — HIGH (ref 43–77)
NRBC # FLD: 0 — SIGNIFICANT CHANGE UP
PHOSPHATE SERPL-MCNC: 2.9 MG/DL — SIGNIFICANT CHANGE UP (ref 2.5–4.5)
PLATELET # BLD AUTO: 660 K/UL — HIGH (ref 150–400)
PMV BLD: 9.2 FL — SIGNIFICANT CHANGE UP (ref 7–13)
POTASSIUM SERPL-MCNC: 4.4 MMOL/L — SIGNIFICANT CHANGE UP (ref 3.5–5.3)
POTASSIUM SERPL-SCNC: 4.4 MMOL/L — SIGNIFICANT CHANGE UP (ref 3.5–5.3)
RBC # BLD: 4.91 M/UL — SIGNIFICANT CHANGE UP (ref 3.8–5.2)
RBC # FLD: 19.7 % — HIGH (ref 10.3–14.5)
SODIUM SERPL-SCNC: 127 MMOL/L — LOW (ref 135–145)
WBC # BLD: 11.35 K/UL — HIGH (ref 3.8–10.5)
WBC # FLD AUTO: 11.35 K/UL — HIGH (ref 3.8–10.5)

## 2018-10-28 RX ORDER — ONDANSETRON 8 MG/1
4 TABLET, FILM COATED ORAL ONCE
Qty: 0 | Refills: 0 | Status: COMPLETED | OUTPATIENT
Start: 2018-10-28 | End: 2018-10-28

## 2018-10-28 RX ORDER — FAMOTIDINE 10 MG/ML
20 INJECTION INTRAVENOUS ONCE
Qty: 0 | Refills: 0 | Status: COMPLETED | OUTPATIENT
Start: 2018-10-28 | End: 2018-10-28

## 2018-10-28 RX ORDER — METOCLOPRAMIDE HCL 10 MG
10 TABLET ORAL ONCE
Qty: 0 | Refills: 0 | Status: COMPLETED | OUTPATIENT
Start: 2018-10-28 | End: 2018-10-28

## 2018-10-28 RX ADMIN — FAMOTIDINE 20 MILLIGRAM(S): 10 INJECTION INTRAVENOUS at 20:37

## 2018-10-28 RX ADMIN — Medication 10 MILLIGRAM(S): at 09:00

## 2018-10-28 RX ADMIN — Medication 10 MILLIGRAM(S): at 05:14

## 2018-10-28 RX ADMIN — Medication 100 MILLIGRAM(S): at 05:14

## 2018-10-28 RX ADMIN — Medication 2: at 09:00

## 2018-10-28 RX ADMIN — Medication 10 MILLIGRAM(S): at 08:59

## 2018-10-28 RX ADMIN — FAMOTIDINE 20 MILLIGRAM(S): 10 INJECTION INTRAVENOUS at 08:59

## 2018-10-28 RX ADMIN — ONDANSETRON 4 MILLIGRAM(S): 8 TABLET, FILM COATED ORAL at 09:00

## 2018-10-28 RX ADMIN — ONDANSETRON 4 MILLIGRAM(S): 8 TABLET, FILM COATED ORAL at 19:22

## 2018-10-28 RX ADMIN — Medication 100 MILLIGRAM(S): at 17:59

## 2018-10-28 RX ADMIN — ENOXAPARIN SODIUM 40 MILLIGRAM(S): 100 INJECTION SUBCUTANEOUS at 12:50

## 2018-10-28 RX ADMIN — LISINOPRIL 20 MILLIGRAM(S): 2.5 TABLET ORAL at 05:14

## 2018-10-28 NOTE — PROGRESS NOTE ADULT - ASSESSMENT
79y/o POD#6 from Ex-lap, FEDERICO, BSO, resection of pelvic mass, PPALND, omentectomy, peritoneal biopsy for pelvic mass (frozen = serous carcinoma) in stable condition. Hospital course has been complicated by acute blood loss anemia s/p 1 uPRBCs. Current issues include acute nausea and vomiting.

## 2018-10-28 NOTE — PROGRESS NOTE ADULT - SUBJECTIVE AND OBJECTIVE BOX
POD#6   HD#7    Patient seen and examined at bedside. Overnight, she reports 2 episodes of non-bilious, non-bloody emesis. She reports minimal improvement in nausea with reglan. She additionally reports a burning pain in her chest going up to her throat when she stands that resolves spontaneously. She reports diffuse pain in her abdomen that is controlled with oral analgesics. Patient is ambulating with assistance. She is passing flatus, voiding spontaneously. She hasn't had a BM since admission. Denies CP, SOB, N/V, fevers, and chills.    Vital Signs Last 24 Hours  T(C): 36.7 (10-28-18 @ 05:20), Max: 36.9 (10-27-18 @ 14:09)  HR: 98 (10-28-18 @ 05:20) (97 - 104)  BP: 152/65 (10-28-18 @ 05:20) (148/76 - 174/66)  RR: 18 (10-28-18 @ 05:20) (17 - 18)  SpO2: 94% (10-28-18 @ 05:20) (92% - 96%)    I&O's Summary    26 Oct 2018 07:01  -  27 Oct 2018 07:00  --------------------------------------------------------  IN: 0 mL / OUT: 1950 mL / NET: -1950 mL    27 Oct 2018 07:01  -  28 Oct 2018 06:58  --------------------------------------------------------  IN: 0 mL / OUT: 900 mL / NET: -900 mL        Physical Exam:  General: NAD  CV: RRR, no murmurs, S1, S3  Lungs: CTAB, symmetrical expansion  Abdomen: Soft, non-tender, non-distended, +BS  Incision: vertical incision, C/D/I, steri strips in place, abdominal binder  Ext: No pain or swelling    Labs:                        10.1   11.35 )-----------( 660      ( 28 Oct 2018 04:28 )             32.0   baso 0.4    eos 1.0    imm gran 0.7    lymph 6.6    mono 5.6    poly 85.7       MEDICATIONS  (STANDING):  acetaminophen   Tablet .. 650 milliGRAM(s) Oral every 6 hours  dextrose 5%. 1000 milliLiter(s) (50 mL/Hr) IV Continuous <Continuous>  dextrose 50% Injectable 25 Gram(s) IV Push once  docusate sodium 100 milliGRAM(s) Oral two times a day  enoxaparin Injectable 40 milliGRAM(s) SubCutaneous daily  glipiZIDE 10 milliGRAM(s) Oral <User Schedule>  glipiZIDE 5 milliGRAM(s) Oral <User Schedule>  insulin lispro (HumaLOG) corrective regimen sliding scale   SubCutaneous three times a day before meals  insulin lispro (HumaLOG) corrective regimen sliding scale   SubCutaneous at bedtime  ketorolac   Injectable 15 milliGRAM(s) IV Push every 8 hours  lisinopril 20 milliGRAM(s) Oral daily  senna 2 Tablet(s) Oral at bedtime    MEDICATIONS  (PRN):  dextrose 40% Gel 15 Gram(s) Oral once PRN Blood Glucose LESS THAN 70 milliGRAM(s)/deciliter  glucagon  Injectable 1 milliGRAM(s) IntraMuscular once PRN Glucose LESS THAN 70 milligrams/deciliter  simethicone 80 milliGRAM(s) Chew every 6 hours PRN Gas  traMADol 25 milliGRAM(s) Oral every 6 hours PRN Moderate Pain (4 - 6)

## 2018-10-29 DIAGNOSIS — E87.1 HYPO-OSMOLALITY AND HYPONATREMIA: ICD-10-CM

## 2018-10-29 LAB
BUN SERPL-MCNC: 7 MG/DL — SIGNIFICANT CHANGE UP (ref 7–23)
CALCIUM SERPL-MCNC: 8.1 MG/DL — LOW (ref 8.4–10.5)
CHLORIDE SERPL-SCNC: 84 MMOL/L — LOW (ref 98–107)
CHLORIDE UR-SCNC: 120 MMOL/L — SIGNIFICANT CHANGE UP
CO2 SERPL-SCNC: 23 MMOL/L — SIGNIFICANT CHANGE UP (ref 22–31)
CREAT SERPL-MCNC: 0.35 MG/DL — LOW (ref 0.5–1.3)
GLUCOSE BLDC GLUCOMTR-MCNC: 108 MG/DL — HIGH (ref 70–99)
GLUCOSE BLDC GLUCOMTR-MCNC: 109 MG/DL — HIGH (ref 70–99)
GLUCOSE BLDC GLUCOMTR-MCNC: 89 MG/DL — SIGNIFICANT CHANGE UP (ref 70–99)
GLUCOSE BLDC GLUCOMTR-MCNC: 93 MG/DL — SIGNIFICANT CHANGE UP (ref 70–99)
GLUCOSE SERPL-MCNC: 87 MG/DL — SIGNIFICANT CHANGE UP (ref 70–99)
HCT VFR BLD CALC: 33.8 % — LOW (ref 34.5–45)
HGB BLD-MCNC: 10.8 G/DL — LOW (ref 11.5–15.5)
MAGNESIUM SERPL-MCNC: 1.9 MG/DL — SIGNIFICANT CHANGE UP (ref 1.6–2.6)
MCHC RBC-ENTMCNC: 20.7 PG — LOW (ref 27–34)
MCHC RBC-ENTMCNC: 32 % — SIGNIFICANT CHANGE UP (ref 32–36)
MCV RBC AUTO: 64.8 FL — LOW (ref 80–100)
NRBC # FLD: 0 — SIGNIFICANT CHANGE UP
OSMOLALITY SERPL: 255 MOSMO/KG — LOW (ref 275–295)
OSMOLALITY UR: 488 MOSMO/KG — SIGNIFICANT CHANGE UP (ref 50–1200)
PHOSPHATE SERPL-MCNC: 3 MG/DL — SIGNIFICANT CHANGE UP (ref 2.5–4.5)
PLATELET # BLD AUTO: 675 K/UL — HIGH (ref 150–400)
PMV BLD: 9 FL — SIGNIFICANT CHANGE UP (ref 7–13)
POTASSIUM SERPL-MCNC: 4.6 MMOL/L — SIGNIFICANT CHANGE UP (ref 3.5–5.3)
POTASSIUM SERPL-SCNC: 4.6 MMOL/L — SIGNIFICANT CHANGE UP (ref 3.5–5.3)
POTASSIUM UR-SCNC: 31 MMOL/L — SIGNIFICANT CHANGE UP
RBC # BLD: 5.22 M/UL — HIGH (ref 3.8–5.2)
RBC # FLD: 19.9 % — HIGH (ref 10.3–14.5)
SODIUM SERPL-SCNC: 122 MMOL/L — LOW (ref 135–145)
SODIUM UR-SCNC: 163 MMOL/L — SIGNIFICANT CHANGE UP
WBC # BLD: 9.97 K/UL — SIGNIFICANT CHANGE UP (ref 3.8–10.5)
WBC # FLD AUTO: 9.97 K/UL — SIGNIFICANT CHANGE UP (ref 3.8–10.5)

## 2018-10-29 PROCEDURE — 99254 IP/OBS CNSLTJ NEW/EST MOD 60: CPT | Mod: GC

## 2018-10-29 RX ORDER — ONDANSETRON 8 MG/1
4 TABLET, FILM COATED ORAL ONCE
Qty: 0 | Refills: 0 | Status: COMPLETED | OUTPATIENT
Start: 2018-10-29 | End: 2018-10-29

## 2018-10-29 RX ORDER — SODIUM,POTASSIUM PHOSPHATES 278-250MG
1 POWDER IN PACKET (EA) ORAL ONCE
Qty: 0 | Refills: 0 | Status: DISCONTINUED | OUTPATIENT
Start: 2018-10-29 | End: 2018-10-29

## 2018-10-29 RX ORDER — PANTOPRAZOLE SODIUM 20 MG/1
40 TABLET, DELAYED RELEASE ORAL DAILY
Qty: 0 | Refills: 0 | Status: DISCONTINUED | OUTPATIENT
Start: 2018-10-29 | End: 2018-10-31

## 2018-10-29 RX ORDER — SODIUM,POTASSIUM PHOSPHATES 278-250MG
1 POWDER IN PACKET (EA) ORAL ONCE
Qty: 0 | Refills: 0 | Status: COMPLETED | OUTPATIENT
Start: 2018-10-29 | End: 2018-10-29

## 2018-10-29 RX ADMIN — Medication 63.75 MILLIMOLE(S): at 14:33

## 2018-10-29 RX ADMIN — LISINOPRIL 20 MILLIGRAM(S): 2.5 TABLET ORAL at 05:21

## 2018-10-29 RX ADMIN — ENOXAPARIN SODIUM 40 MILLIGRAM(S): 100 INJECTION SUBCUTANEOUS at 12:23

## 2018-10-29 RX ADMIN — Medication 100 MILLIGRAM(S): at 21:27

## 2018-10-29 RX ADMIN — PANTOPRAZOLE SODIUM 40 MILLIGRAM(S): 20 TABLET, DELAYED RELEASE ORAL at 12:23

## 2018-10-29 RX ADMIN — ONDANSETRON 4 MILLIGRAM(S): 8 TABLET, FILM COATED ORAL at 14:39

## 2018-10-29 RX ADMIN — ONDANSETRON 4 MILLIGRAM(S): 8 TABLET, FILM COATED ORAL at 10:36

## 2018-10-29 RX ADMIN — ONDANSETRON 4 MILLIGRAM(S): 8 TABLET, FILM COATED ORAL at 21:25

## 2018-10-29 NOTE — DIETITIAN INITIAL EVALUATION ADULT. - SOURCE
Patient with nausea at time of visit and reports inability to talk at this time. Provided limited information. Information obtained from extensive chart review./patient

## 2018-10-29 NOTE — CONSULT NOTE ADULT - ATTENDING COMMENTS
SIADH in the setting of nausea/vomiting/pain    Plese start salt tabs, would not fluid restrict as patient is having nausea/vomiting.

## 2018-10-29 NOTE — DIETITIAN INITIAL EVALUATION ADULT. - OTHER INFO
Initial Dietitian Evaluation 2/2 to extended length of stay. Per chart review patient with medical history of DM2, osteoarthritis, HTN, rheumatoid arthritis and intra-abdominal and pelvic swelling, mass now  s/p Ex-lap, FEDERICO, Bilateral Salpingo-oophorectomy, resection of pelvic mass, pelvic and para-aortic lymph node dissection, Omentectomy. and peritoneal biopsy (10/20). Patient reports good appetite and PO intake PTA. 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 Patient reports severe nausea today. Notes she has been unable to tolerate diet, episode of vomiting today after eating cream of wheat. Patient reports Pepcid helps alleviate nausea. Patient also on Zofran PRN. Per chart patient had BM. Patient reports #. Admit weight noted to be 113#. Question accuracy of bed-scale. Patient did not report weight loss PTA. Since admission patient with minimal PO intake (~7days poor PO). Encourage PO intake as tolerated.

## 2018-10-29 NOTE — DIETITIAN INITIAL EVALUATION ADULT. - ENERGY NEEDS
Weight: 51.7kg (10/22)   Height: 160.02cm   BMI: 20.2kg/m^2  IBW: 52.3kg +/-10%  Edema: 2+ left/right leg

## 2018-10-29 NOTE — PROGRESS NOTE ADULT - ASSESSMENT
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 Hospital course has been complicated by acute blood loss anemia s/p 1 uPRBCs. Pt is having some nausea but no emesis since yesterday. She is tolerating clears. 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 Hospital course has been complicated by acute blood loss anemia s/p 1 uPRBCs. Pt states nausea improved and no emesis since yesterday. She is tolerating clears. She is having BM ( x 3).

## 2018-10-29 NOTE — DIETITIAN INITIAL EVALUATION ADULT. - PHYSICAL APPEARANCE
underweight/nutrition focused physical exam not appropriate at this time given patient reports nausea and feeling unwell, patient does appear thin

## 2018-10-29 NOTE — MEDICAL STUDENT PROGRESS NOTE(EDUCATION) - SUBJECTIVE AND OBJECTIVE BOX
S: IMANI is a 78 yr old woman w/ hx of DM2, RA, htn, and BCC, presents POD 7 s/p ex-lap, FEDERICO, resection of pelvic mass, PPALND, omentectomy, and peritoneal biopsy. Today, she feels weak and tired. She was not able to ambulate with PT and she is not tolerating PO. Following her first few bites of breakfast, she felt nauseous and vomited. Vomit was non-billious and non-bloody. She did not try to eat lunch. She is passing gas and endorses having a BM, 2 yesterday (10/28) and one today. BM is formed and consistent in color.     O:   Vitals: T: 36.6-36.8; HR: 92-98; BP: 170-180/65-70  CBC: WBC: 9.9; Hg: 10.8; Ht: 33.8; Plt: 675  CMP: Na: 122 (127 (10/28), K:4.6, Cl: 84, HCO3: 23, BUN: 7, Cr: 0.35, glucose: 87, Ca: 8.1, Phos: 3  UrineNa: 183, urineK: 3.1, urineCl: 120 S: Pt. examined at bedside. States she feels weak and tired. She was not able to ambulate with PT and she is not tolerating PO. Following her first few bites of breakfast, she felt nauseous and vomited. Vomit was non-billious and non-bloody. She did not try to eat lunch. She is passing gas and endorses having a BM, 2 yesterday (10/28) and one today. BM is formed and consistent in color. Denies fever and chills, chest pain, and SOB.     O:   Vitals: T: 36.6-36.8; HR: 92-98; BP: 170-180/65-70  CBC: WBC: 9.9; Hg: 10.8; Ht: 33.8; Plt: 675  CMP: Na: 122 (127 (10/28), K:4.6, Cl: 84, HCO3: 23, BUN: 7, Cr: 0.35, glucose: 87, Ca: 8.1, Phos: 3  UrineNa: 183, urineK: 3.1, urineCl: 120

## 2018-10-29 NOTE — MEDICAL STUDENT PROGRESS NOTE(EDUCATION) - NS MD HP STUD ASPLAN PLAN FT
Plan pending discussion with resident and attending:   Neuro: GARTH profen for pain  CV: hemodynamically stable. increased BP in the setting of hypertonic saline.   Pulm: saturating well on room air   GI: unable to tolerate regular diet with nausea. plan; f/u with ensure to increase calorie intake   : voiding spontaneously   Heme: hospital course complicated by anemia, now resolving following 1u pRBCs, and hyponatremia. hyponatremia likely secondary to decreased PO intake. plan; tx with hypertonic saline (be sure not to correct too quickly. no more than increase in 8/ 24 hrs). f/u with nephrology consult. Plan pending discussion with resident and attending:   Neuro: IB profen for pain  CV: hemodynamically stable. increased BP in the setting of hypertonic saline.   Pulm: saturating well on room air   GI: unable to tolerate diabetic diet due to nausea and vomiting. plan; f/u with ensure to increase calorie intake. encourage out of bed to increase strength.   : voiding spontaneously   Heme: hospital course complicated by anemia, now resolving following 1u pRBCs, and hyponatremia. hyponatremia likely secondary to decreased PO intake. plan; tx with hypertonic saline (be sure not to correct too quickly. no more than increase in 8/ 24 hrs). f/u with nephrology consult. Plan pending discussion with resident and attending:   Neuro: IB profen for pain  CV: hemodynamically stable. increased BP in the setting of hypertonic saline.   Pulm: saturating well on room air   GI: minimally able to tolerate diabetic diet due to nausea and vomiting. plan; f/u with ensure to increase calorie intake. encourage out of bed to increase strength.   : voiding spontaneously   Heme: hospital course complicated by anemia, now resolving following 1u pRBCs, and hyponatremia. hyponatremia likely secondary to decreased PO intake. plan; tx with hypertonic saline (be sure not to correct too quickly. no more than increase in 8/ 24 hrs). f/u with nephrology consult.

## 2018-10-29 NOTE — MEDICAL STUDENT PROGRESS NOTE(EDUCATION) - NS MD HP STUD ASPLAN ASSES FT
IMANI is a 78 yr old woman with hx of DMII, RA, htn, and HCC, presents POD 7 following ex-lap, FEDERICO, BSO, resection of pelvic mass, PPALND, omentectomy, and peritoneal biopsy. She is deconditioned; unable to tolerate PO or ambulate however endorses passing gas and BM. Hospital course c/b anemia, now resolving, and new onset hyponatremia. IMANI is a 78 yr old woman with hx of DMII, RA, htn, and HCC, presents POD 7 following ex-lap, FEDERICO, BSO, resection of pelvic mass, PPALND, omentectomy, and peritoneal biopsy. She is weak; minimally tolerating PO and unable to ambulate; however, endorses passing gas and BM. Hospital course c/b anemia, now resolving, and new onset hyponatremia.

## 2018-10-29 NOTE — CONSULT NOTE ADULT - ASSESSMENT
77 yo female with PMH DM2, RA, hypertension, and macular degeneration s/p Total abdominal hysterectomy and bilateral salpingo-oophorectomy, Resection of left pelvic mass, Pelvic lymph node dissection, Dissection of para-aortic lymph nodes, Peritoneal biopsy on 10/22/18. Nephrology consulted for hyponatremia.

## 2018-10-29 NOTE — CONSULT NOTE ADULT - SUBJECTIVE AND OBJECTIVE BOX
[facility] DIVISION OF KIDNEY DISEASES AND HYPERTENSION -- INITIAL CONSULT NOTE  --------------------------------------------------------------------------------    HPI: 79 yo female with PMH DM2, RA, hypertension, and macular degeneration s/p Total abdominal hysterectomy and bilateral salpingo-oophorectomy, Resection of left pelvic mass, Pelvic lymph node dissection, Dissection of para-aortic lymph nodes, Peritoneal biopsy on 10/22/18. Nephrology consulted for hyponatremia. Pt admitted with Serum sodium of 133 on 10/23/18. Pt now with decreased serum sodium of 122 today. Pt admits to significant nausea and vomiting for the last several days despite medical management. Pt unable to keep any solid food down. Pt denies pain.     PAST HISTORY  --------------------------------------------------------------------------------  PAST MEDICAL & SURGICAL HISTORY:  Skin abnormality  Intra-abdominal and pelvic swelling, mass and lump, unspecified site  Osteoarthritis  Rheumatoid arthritis  Diabetes: type 2  HTN (hypertension)  Basal cell carcinoma: removed from neck  Bartholin cyst: removed  H/O:     FAMILY HISTORY:  Family history of cancer (Father, Grandparent)  Family history of heart disease (Father)    PAST SOCIAL HISTORY:    ALLERGIES & MEDICATIONS  --------------------------------------------------------------------------------  Allergies    Benadryl (Unknown)  codeine (Rash)  patient states mushrooms make her &quot;purple&quot; (Unknown)  penicillin (Rash)  sulfa drugs (Rash (Mild to Mod))    Intolerances      Standing Inpatient Medications  acetaminophen   Tablet .. 650 milliGRAM(s) Oral every 6 hours  dextrose 5%. 1000 milliLiter(s) IV Continuous <Continuous>  dextrose 50% Injectable 25 Gram(s) IV Push once  docusate sodium 100 milliGRAM(s) Oral two times a day  enoxaparin Injectable 40 milliGRAM(s) SubCutaneous daily  glipiZIDE 10 milliGRAM(s) Oral <User Schedule>  glipiZIDE 5 milliGRAM(s) Oral <User Schedule>  insulin lispro (HumaLOG) corrective regimen sliding scale   SubCutaneous three times a day before meals  insulin lispro (HumaLOG) corrective regimen sliding scale   SubCutaneous at bedtime  lisinopril 20 milliGRAM(s) Oral daily  ondansetron Injectable 4 milliGRAM(s) IV Push once  pantoprazole  Injectable 40 milliGRAM(s) IV Push daily  senna 2 Tablet(s) Oral at bedtime    PRN Inpatient Medications  dextrose 40% Gel 15 Gram(s) Oral once PRN  glucagon  Injectable 1 milliGRAM(s) IntraMuscular once PRN  simethicone 80 milliGRAM(s) Chew every 6 hours PRN  traMADol 25 milliGRAM(s) Oral every 6 hours PRN      REVIEW OF SYSTEMS  --------------------------------------------------------------------------------  Gen: No weakness  Skin: No rashes  Head/Eyes/Ears/Mouth: No headache  Respiratory: No dyspnea  CV: No chest pain, PND, orthopnea  GI: No abdominal pain, diarrhea, constipation, +nausea, +vomiting  : No increased frequency  MSK: No joint pain/swelling; no back pain; no edema  Neuro: No dizziness/lightheadedness    All other systems were reviewed and are negative, except as noted.    VITALS/PHYSICAL EXAM  --------------------------------------------------------------------------------  T(C): 36.5 (10-29-18 @ 16:57), Max: 36.8 (10-29-18 @ 10:24)  HR: 98 (10-29-18 @ 16:57) (92 - 100)  BP: 173/64 (10-29-18 @ 16:57) (160/70 - 180/70)  RR: 16 (10-29-18 @ 16:57) (16 - 17)  SpO2: 94% (10-29-18 @ 16:57) (94% - 97%)  Wt(kg): --    10-28-18 @ 07:01  -  10-29-18 @ 07:00  --------------------------------------------------------  IN: 0 mL / OUT: 1950 mL / NET: -1950 mL    10-29-18 @ 07:01  -  10-29-18 @ 19:38  --------------------------------------------------------  IN: 0 mL / OUT: 350 mL / NET: -350 mL    Physical Exam:  	Gen: NAD  	Pulm: CTA B/L  	CV: RRR, S1S2; no rub  	Abd: +BS, soft, nontender/nondistended + healing incision  	UE: Warm  	LE: Warm, no edema  	Psych: Normal affect and mood  	Skin: Warm, without rashes      LABS/STUDIES  --------------------------------------------------------------------------------              10.8   9.97  >-----------<  675      [10-29-18 @ 06:20]              33.8     122  |  84  |  7   ----------------------------<  87      [10-29-18 @ 06:20]  4.6   |  23  |  0.35        Ca     8.1     [10-29-18 @ 06:20]      Mg     1.9     [10-29-18 @ 06:20]      Phos  3.0     [10-29-18 @ 06:20]    Serum Osmolality 255      [10-29-18 @ 06:20]    Creatinine Trend:  SCr 0.35 [10-29 @ 06:20]  SCr 0.33 [10-28 @ 04:28]  SCr 0.38 [10-27 @ 04:50]  SCr 0.43 [10-24 @ 03:57]  SCr 0.55 [10-23 @ 05:45]    Urinalysis - [13 @ 15:48]      Color Colorless / Appearance Clear / SG 1.007 / pH 7.5      Gluc 150 / Ketone Negative  / Bili Negative / Urobili Normal       Blood Negative / Protein Negative / Leuk Est Negative / Nitrite Negative      RBC  / WBC 0-2 / Hyaline  / Gran  / Sq Epi  / Non Sq Epi OCC / Bacteria     Urine Sodium 163      [10-29-18 @ 14:47]  Urine Potassium 31.0      [10-29-18 @ 14:47]  Urine Chloride 120      [10-29-18 @ 14:47]  Urine Osmolality 488      [10-29-18 @ 14:55]    HbA1c 6.4      [10-09-18 @ 14:20]

## 2018-10-29 NOTE — PROGRESS NOTE ADULT - PROBLEM SELECTOR PLAN 1
Neuro: c/w oral analgesics  CV: Hemodynamically stable, continue lisinopril for hypertensive control. Consider increasing dose  Pulm: Saturating well on room air, encourage incentive spirometry  GI: Advance diet to reg. Pepcid for GERD. C/w  zofran and reglan for nausea  : voiding spontaneously  Heme: c/w lovenox and SCDs for DVT ppx  Endo: Glipizide and ISS for BM control  MSK: PT following  Dispo: Continue routine post-op care, discharge to outpatient rehab facility per PT recommendations    RJ Baca, pgy2 Neuro: c/w oral analgesics  CV: Hemodynamically stable, continue lisinopril for hypertensive control.   Pulm: Saturating well on room air, encourage incentive spirometry  GI: Advance diet to reg. Pepcid for GERD. Antiemetic PRN.  : voiding spontaneously  Heme: c/w lovenox and SCDs for DVT ppx  Endo: Glipizide and ISS for BM control  MSK: PT following  Dispo: Continue routine post-op care, discharge to outpatient rehab facility per PT recommendations    RJ Baca, pgy2

## 2018-10-29 NOTE — DIETITIAN INITIAL EVALUATION ADULT. - PERTINENT MEDS FT
MEDICATIONS  (STANDING):  acetaminophen   Tablet .. 650 milliGRAM(s) Oral every 6 hours  dextrose 5%. 1000 milliLiter(s) (50 mL/Hr) IV Continuous <Continuous>  dextrose 50% Injectable 25 Gram(s) IV Push once  docusate sodium 100 milliGRAM(s) Oral two times a day  enoxaparin Injectable 40 milliGRAM(s) SubCutaneous daily  glipiZIDE 10 milliGRAM(s) Oral <User Schedule>  glipiZIDE 5 milliGRAM(s) Oral <User Schedule>  insulin lispro (HumaLOG) corrective regimen sliding scale   SubCutaneous three times a day before meals  insulin lispro (HumaLOG) corrective regimen sliding scale   SubCutaneous at bedtime  lisinopril 20 milliGRAM(s) Oral daily  pantoprazole  Injectable 40 milliGRAM(s) IV Push daily  potassium phosphate / sodium phosphate powder 1 Packet(s) Oral once  senna 2 Tablet(s) Oral at bedtime    MEDICATIONS  (PRN):  dextrose 40% Gel 15 Gram(s) Oral once PRN Blood Glucose LESS THAN 70 milliGRAM(s)/deciliter  glucagon  Injectable 1 milliGRAM(s) IntraMuscular once PRN Glucose LESS THAN 70 milligrams/deciliter  simethicone 80 milliGRAM(s) Chew every 6 hours PRN Gas  traMADol 25 milliGRAM(s) Oral every 6 hours PRN Moderate Pain (4 - 6)

## 2018-10-29 NOTE — DIETITIAN INITIAL EVALUATION ADULT. - NS AS NUTRI INTERV MEALS SNACK
1. Continue Consistent Carbohydrate no snacks diet as tolerated. Recommend [employer] Therapeutic Nutrition Shake 240mls 3x daily (660kcals, 30g protein) 2.Consider changing to Clear Liquid diet if patient continues to be unable to tolerate solid PO. 4. If patient with persisitent inability to tolerate PO diet,  consider alternate means of nutrition, as clinically appropriate, in order for patient to meet needs. 4. Consider multivitamin daily for micronutrient coverage. 5. Please Encourage po intake, assist with meals and menu selections, provide alternatives PRN. 6. Monitor weights, labs, BM's, skin integrity, p.o. intake.

## 2018-10-29 NOTE — PROGRESS NOTE ADULT - SUBJECTIVE AND OBJECTIVE BOX
POD#7   HD#8    Patient seen and examined at bedside. Overnight, she reports 2 episodes of non-bilious, non-bloody emesis yesterday. She is having BM after a dulcolax suppository yesterday.  Nausea is slightly improvement of her nausea. Patient is ambulating with assistance and has been working with PT. She is passing flatus, voiding spontaneously. She is tolerating clears CP, SOB, N/V, fevers, and chills.  BPs overnight 160-175/70s.     Vital Signs Last 24 Hours  T(C): 36.7 (10-29-18 @ 05:24), Max: 36.7 (10-28-18 @ 14:44)  HR: 97 (10-29-18 @ 05:24) (89 - 100)  BP: 175/72 (10-29-18 @ 05:24) (155/64 - 177/76)  RR: 17 (10-29-18 @ 05:24) (16 - 18)  SpO2: 95% (10-29-18 @ 05:24) (94% - 98%)    I&O's Summary    27 Oct 2018 07:01  -  28 Oct 2018 07:00  --------------------------------------------------------  IN: 0 mL / OUT: 900 mL / NET: -900 mL    28 Oct 2018 07:01  -  29 Oct 2018 06:59  --------------------------------------------------------  IN: 0 mL / OUT: 1950 mL / NET: -1950 mL        Physical Exam:  General: NAD  CV: NR, RR, S1, S2, no M/R/G  Lungs: CTA-B  Abdomen: Soft, non-tender, non-distended, +BS  Incision: vertical incision CDI  Ext: No pain or swelling    Labs:                        10.1   11.35 )-----------( 660      ( 28 Oct 2018 04:28 )             32.0   baso 0.4    eos 1.0    imm gran 0.7    lymph 6.6    mono 5.6    poly 85.7     10-28    127<L>  |  89<L>  |  7   ----------------------------<  154<H>  4.4   |  22  |  0.33<L>    Ca    8.1<L>      28 Oct 2018 04:28  Phos  2.9     10-28  Mg     1.9     10-28            Blood Type: O Positive      MEDICATIONS  (STANDING):  acetaminophen   Tablet .. 650 milliGRAM(s) Oral every 6 hours  dextrose 5%. 1000 milliLiter(s) (50 mL/Hr) IV Continuous <Continuous>  dextrose 50% Injectable 25 Gram(s) IV Push once  docusate sodium 100 milliGRAM(s) Oral two times a day  enoxaparin Injectable 40 milliGRAM(s) SubCutaneous daily  glipiZIDE 10 milliGRAM(s) Oral <User Schedule>  glipiZIDE 5 milliGRAM(s) Oral <User Schedule>  insulin lispro (HumaLOG) corrective regimen sliding scale   SubCutaneous three times a day before meals  insulin lispro (HumaLOG) corrective regimen sliding scale   SubCutaneous at bedtime  lisinopril 20 milliGRAM(s) Oral daily  pantoprazole  Injectable 40 milliGRAM(s) IV Push daily  senna 2 Tablet(s) Oral at bedtime    MEDICATIONS  (PRN):  dextrose 40% Gel 15 Gram(s) Oral once PRN Blood Glucose LESS THAN 70 milliGRAM(s)/deciliter  glucagon  Injectable 1 milliGRAM(s) IntraMuscular once PRN Glucose LESS THAN 70 milligrams/deciliter  simethicone 80 milliGRAM(s) Chew every 6 hours PRN Gas  traMADol 25 milliGRAM(s) Oral every 6 hours PRN Moderate Pain (4 - 6) POD#7   HD#8    Patient seen and examined at bedside. Overnight, she reports 2 episodes of non-bilious, non-bloody emesis yesterday. She is having BM after a dulcolax suppository yesterday.  Nausea is slightly improvement of her nausea. Patient is ambulating with assistance and has been working with PT. She is passing flatus, voiding spontaneously. She is tolerating clears CP, SOB, N/V, fevers, and chills.  BPs overnight 160-175/70s.     Vital Signs Last 24 Hours  T(C): 36.7 (10-29-18 @ 05:24), Max: 36.7 (10-28-18 @ 14:44)  HR: 97 (10-29-18 @ 05:24) (89 - 100)  BP: 175/72 (10-29-18 @ 05:24) (155/64 - 177/76)  RR: 17 (10-29-18 @ 05:24) (16 - 18)  SpO2: 95% (10-29-18 @ 05:24) (94% - 98%)    I&O's Summary    27 Oct 2018 07:01  -  28 Oct 2018 07:00  --------------------------------------------------------  IN: 0 mL / OUT: 900 mL / NET: -900 mL    28 Oct 2018 07:01  -  29 Oct 2018 06:59  --------------------------------------------------------  IN: 0 mL / OUT: 1950 mL / NET: -1950 mL        Physical Exam:  General: NAD  CV: NR, RR, S1, S2, no M/R/G  Lungs: CTA-B  Abdomen: Soft, appropriately tender, non-distended, +BS  Incision: vertical incision CDI  Ext: No pain or swelling    Labs:                        10.1   11.35 )-----------( 660      ( 28 Oct 2018 04:28 )             32.0   baso 0.4    eos 1.0    imm gran 0.7    lymph 6.6    mono 5.6    poly 85.7     10-28    127<L>  |  89<L>  |  7   ----------------------------<  154<H>  4.4   |  22  |  0.33<L>    Ca    8.1<L>      28 Oct 2018 04:28  Phos  2.9     10-28  Mg     1.9     10-28            Blood Type: O Positive      MEDICATIONS  (STANDING):  acetaminophen   Tablet .. 650 milliGRAM(s) Oral every 6 hours  dextrose 5%. 1000 milliLiter(s) (50 mL/Hr) IV Continuous <Continuous>  dextrose 50% Injectable 25 Gram(s) IV Push once  docusate sodium 100 milliGRAM(s) Oral two times a day  enoxaparin Injectable 40 milliGRAM(s) SubCutaneous daily  glipiZIDE 10 milliGRAM(s) Oral <User Schedule>  glipiZIDE 5 milliGRAM(s) Oral <User Schedule>  insulin lispro (HumaLOG) corrective regimen sliding scale   SubCutaneous three times a day before meals  insulin lispro (HumaLOG) corrective regimen sliding scale   SubCutaneous at bedtime  lisinopril 20 milliGRAM(s) Oral daily  pantoprazole  Injectable 40 milliGRAM(s) IV Push daily  senna 2 Tablet(s) Oral at bedtime    MEDICATIONS  (PRN):  dextrose 40% Gel 15 Gram(s) Oral once PRN Blood Glucose LESS THAN 70 milliGRAM(s)/deciliter  glucagon  Injectable 1 milliGRAM(s) IntraMuscular once PRN Glucose LESS THAN 70 milligrams/deciliter  simethicone 80 milliGRAM(s) Chew every 6 hours PRN Gas  traMADol 25 milliGRAM(s) Oral every 6 hours PRN Moderate Pain (4 - 6) POD#7   HD#8    Patient seen and examined at bedside. She is having BM after a dulcolax suppository yesterday.  Nausea is resolved. Patient is ambulating with assistance and has been working with PT. She is passing flatus, voiding spontaneously. She is tolerating clears CP, SOB, N/V, fevers, and chills.  BPs overnight 160-175/70s.     Vital Signs Last 24 Hours  T(C): 36.7 (10-29-18 @ 05:24), Max: 36.7 (10-28-18 @ 14:44)  HR: 97 (10-29-18 @ 05:24) (89 - 100)  BP: 175/72 (10-29-18 @ 05:24) (155/64 - 177/76)  RR: 17 (10-29-18 @ 05:24) (16 - 18)  SpO2: 95% (10-29-18 @ 05:24) (94% - 98%)    I&O's Summary    27 Oct 2018 07:01  -  28 Oct 2018 07:00  --------------------------------------------------------  IN: 0 mL / OUT: 900 mL / NET: -900 mL    28 Oct 2018 07:01  -  29 Oct 2018 06:59  --------------------------------------------------------  IN: 0 mL / OUT: 1950 mL / NET: -1950 mL        Physical Exam:  General: NAD  CV: NR, RR, S1, S2, no M/R/G  Lungs: CTA-B  Abdomen: Soft, appropriately tender, non-distended, +BS  Incision: vertical incision CDI  Ext: No pain or swelling    Labs:                        10.1   11.35 )-----------( 660      ( 28 Oct 2018 04:28 )             32.0   baso 0.4    eos 1.0    imm gran 0.7    lymph 6.6    mono 5.6    poly 85.7     10-28    127<L>  |  89<L>  |  7   ----------------------------<  154<H>  4.4   |  22  |  0.33<L>    Ca    8.1<L>      28 Oct 2018 04:28  Phos  2.9     10-28  Mg     1.9     10-28            Blood Type: O Positive      MEDICATIONS  (STANDING):  acetaminophen   Tablet .. 650 milliGRAM(s) Oral every 6 hours  dextrose 5%. 1000 milliLiter(s) (50 mL/Hr) IV Continuous <Continuous>  dextrose 50% Injectable 25 Gram(s) IV Push once  docusate sodium 100 milliGRAM(s) Oral two times a day  enoxaparin Injectable 40 milliGRAM(s) SubCutaneous daily  glipiZIDE 10 milliGRAM(s) Oral <User Schedule>  glipiZIDE 5 milliGRAM(s) Oral <User Schedule>  insulin lispro (HumaLOG) corrective regimen sliding scale   SubCutaneous three times a day before meals  insulin lispro (HumaLOG) corrective regimen sliding scale   SubCutaneous at bedtime  lisinopril 20 milliGRAM(s) Oral daily  pantoprazole  Injectable 40 milliGRAM(s) IV Push daily  senna 2 Tablet(s) Oral at bedtime    MEDICATIONS  (PRN):  dextrose 40% Gel 15 Gram(s) Oral once PRN Blood Glucose LESS THAN 70 milliGRAM(s)/deciliter  glucagon  Injectable 1 milliGRAM(s) IntraMuscular once PRN Glucose LESS THAN 70 milligrams/deciliter  simethicone 80 milliGRAM(s) Chew every 6 hours PRN Gas  traMADol 25 milliGRAM(s) Oral every 6 hours PRN Moderate Pain (4 - 6)

## 2018-10-29 NOTE — CONSULT NOTE ADULT - PROBLEM SELECTOR RECOMMENDATION 9
Pt with hypo-osmolar hyponatremia secondary to elevated ADH release in the setting of persistent vomiting. Pt with elevated urine osm of 488 and urine Na of 163. Pt would benefit from initiating salts tabs 1g TID as tolerated by patient.   Monitor serum sodium and continue symptomatic relief of nausea.   Monitor mental status daily

## 2018-10-30 LAB
BUN SERPL-MCNC: 10 MG/DL — SIGNIFICANT CHANGE UP (ref 7–23)
CALCIUM SERPL-MCNC: 8.2 MG/DL — LOW (ref 8.4–10.5)
CHLORIDE SERPL-SCNC: 84 MMOL/L — LOW (ref 98–107)
CO2 SERPL-SCNC: 22 MMOL/L — SIGNIFICANT CHANGE UP (ref 22–31)
CREAT SERPL-MCNC: 0.34 MG/DL — LOW (ref 0.5–1.3)
GLUCOSE BLDC GLUCOMTR-MCNC: 121 MG/DL — HIGH (ref 70–99)
GLUCOSE BLDC GLUCOMTR-MCNC: 132 MG/DL — HIGH (ref 70–99)
GLUCOSE BLDC GLUCOMTR-MCNC: 154 MG/DL — HIGH (ref 70–99)
GLUCOSE BLDC GLUCOMTR-MCNC: 162 MG/DL — HIGH (ref 70–99)
GLUCOSE SERPL-MCNC: 111 MG/DL — HIGH (ref 70–99)
HCT VFR BLD CALC: 32.9 % — LOW (ref 34.5–45)
HGB BLD-MCNC: 10.5 G/DL — LOW (ref 11.5–15.5)
MAGNESIUM SERPL-MCNC: 1.9 MG/DL — SIGNIFICANT CHANGE UP (ref 1.6–2.6)
MCHC RBC-ENTMCNC: 20.4 PG — LOW (ref 27–34)
MCHC RBC-ENTMCNC: 31.9 % — LOW (ref 32–36)
MCV RBC AUTO: 63.9 FL — LOW (ref 80–100)
NRBC # FLD: 0 — SIGNIFICANT CHANGE UP
PHOSPHATE SERPL-MCNC: 3.1 MG/DL — SIGNIFICANT CHANGE UP (ref 2.5–4.5)
PLATELET # BLD AUTO: 746 K/UL — HIGH (ref 150–400)
PMV BLD: 8.9 FL — SIGNIFICANT CHANGE UP (ref 7–13)
POTASSIUM SERPL-MCNC: 4.6 MMOL/L — SIGNIFICANT CHANGE UP (ref 3.5–5.3)
POTASSIUM SERPL-SCNC: 4.6 MMOL/L — SIGNIFICANT CHANGE UP (ref 3.5–5.3)
PREALB SERPL-MCNC: 12 MG/DL — LOW (ref 20–40)
RBC # BLD: 5.15 M/UL — SIGNIFICANT CHANGE UP (ref 3.8–5.2)
RBC # FLD: 18.8 % — HIGH (ref 10.3–14.5)
SODIUM SERPL-SCNC: 120 MMOL/L — CRITICAL LOW (ref 135–145)
SURGICAL PATHOLOGY STUDY: SIGNIFICANT CHANGE UP
WBC # BLD: 9.69 K/UL — SIGNIFICANT CHANGE UP (ref 3.8–10.5)
WBC # FLD AUTO: 9.69 K/UL — SIGNIFICANT CHANGE UP (ref 3.8–10.5)

## 2018-10-30 RX ORDER — SODIUM CHLORIDE 9 MG/ML
1 INJECTION INTRAMUSCULAR; INTRAVENOUS; SUBCUTANEOUS EVERY 8 HOURS
Qty: 0 | Refills: 0 | Status: DISCONTINUED | OUTPATIENT
Start: 2018-10-30 | End: 2018-11-02

## 2018-10-30 RX ORDER — ONDANSETRON 8 MG/1
4 TABLET, FILM COATED ORAL ONCE
Qty: 0 | Refills: 0 | Status: COMPLETED | OUTPATIENT
Start: 2018-10-30 | End: 2018-10-30

## 2018-10-30 RX ADMIN — Medication 5 MILLIGRAM(S): at 18:13

## 2018-10-30 RX ADMIN — Medication 2: at 18:11

## 2018-10-30 RX ADMIN — SODIUM CHLORIDE 1 GRAM(S): 9 INJECTION INTRAMUSCULAR; INTRAVENOUS; SUBCUTANEOUS at 21:24

## 2018-10-30 RX ADMIN — PANTOPRAZOLE SODIUM 40 MILLIGRAM(S): 20 TABLET, DELAYED RELEASE ORAL at 12:48

## 2018-10-30 RX ADMIN — ONDANSETRON 4 MILLIGRAM(S): 8 TABLET, FILM COATED ORAL at 05:30

## 2018-10-30 RX ADMIN — Medication 10 MILLIGRAM(S): at 09:12

## 2018-10-30 RX ADMIN — SODIUM CHLORIDE 1 GRAM(S): 9 INJECTION INTRAMUSCULAR; INTRAVENOUS; SUBCUTANEOUS at 12:48

## 2018-10-30 RX ADMIN — SENNA PLUS 1 TABLET(S): 8.6 TABLET ORAL at 21:25

## 2018-10-30 RX ADMIN — Medication 2: at 12:46

## 2018-10-30 RX ADMIN — Medication 100 MILLIGRAM(S): at 05:36

## 2018-10-30 RX ADMIN — LISINOPRIL 20 MILLIGRAM(S): 2.5 TABLET ORAL at 05:36

## 2018-10-30 RX ADMIN — ENOXAPARIN SODIUM 40 MILLIGRAM(S): 100 INJECTION SUBCUTANEOUS at 12:46

## 2018-10-30 NOTE — PROGRESS NOTE ADULT - PROBLEM SELECTOR PLAN 1
Neuro: c/w oral analgesics  CV: Hemodynamically stable, continue lisinopril for hypertensive control.   Pulm: Saturating well on room air, encourage incentive spirometry  GI: Continue glucerna and regular diet; Pepcid for GERD. Antiemetic PRN.  : voiding spontaneously; 1g TID salt tabs for hyponatremia per Nephorology  Heme: c/w lovenox and SCDs for DVT ppx  Endo: Glipizide and ISS for DM control  MSK: PT following  PSYCH: Holistic care following  Dispo: Continue routine post-op care, discharge to outpatient rehab facility per PT recommendations    YAIMA Holman MD PGY2

## 2018-10-30 NOTE — PROGRESS NOTE ADULT - SUBJECTIVE AND OBJECTIVE BOX
Patient evaluated at bedside due to episode of emesis. Pt was drinking her glucerna and had ~25cc emesis per RN. Pt states following emesis she felt improved and no longer felt nauseous. In the time since then she has started to have some nausea once again. Pt states her pain is well controlled and that she is continuing to pass flatus. Her last BM was yesterday AM and she was out of bed once yesterday. She denies any other concerns at present.    Objective  T(C): 36.6 (10-29-18 @ 21:27), Max: 36.6 (10-29-18 @ 14:23)  HR: 94 (10-29-18 @ 21:27) (94 - 98)  BP: 171/66 (10-29-18 @ 21:27) (171/66 - 180/70)  RR: 16 (10-29-18 @ 21:27) (16 - 16)  SpO2: 96% (10-29-18 @ 21:27) (94% - 96%)  Wt(kg): --    10-28 @ 07:01  -  10-29 @ 07:00  --------------------------------------------------------  IN: 0 mL / OUT: 1950 mL / NET: -1950 mL    10-29 @ 07:01  -  10-30 @ 00:44  --------------------------------------------------------  IN: 0 mL / OUT: 1175 mL / NET: -1175 mL        Gen: NAD  Abd: Soft NT  Ext: NT BL    acetaminophen   Tablet .. 650 milliGRAM(s) Oral every 6 hours  dextrose 40% Gel 15 Gram(s) Oral once PRN  dextrose 5%. 1000 milliLiter(s) IV Continuous <Continuous>  dextrose 50% Injectable 25 Gram(s) IV Push once  docusate sodium 100 milliGRAM(s) Oral two times a day  enoxaparin Injectable 40 milliGRAM(s) SubCutaneous daily  glipiZIDE 10 milliGRAM(s) Oral <User Schedule>  glipiZIDE 5 milliGRAM(s) Oral <User Schedule>  glucagon  Injectable 1 milliGRAM(s) IntraMuscular once PRN  insulin lispro (HumaLOG) corrective regimen sliding scale   SubCutaneous three times a day before meals  insulin lispro (HumaLOG) corrective regimen sliding scale   SubCutaneous at bedtime  lisinopril 20 milliGRAM(s) Oral daily  ondansetron Injectable 4 milliGRAM(s) IV Push once  pantoprazole  Injectable 40 milliGRAM(s) IV Push daily  senna 2 Tablet(s) Oral at bedtime  simethicone 80 milliGRAM(s) Chew every 6 hours PRN  traMADol 25 milliGRAM(s) Oral every 6 hours PRN

## 2018-10-30 NOTE — PROGRESS NOTE ADULT - PROBLEM SELECTOR PLAN 1
Plan: Neuro: c/w oral analgesics  CV: Hemodynamically stable, continue lisinopril for hypertensive control.   Pulm: Saturating well on room air, encourage incentive spirometry  GI: Continue regular diet w/ glucerna. Pepcid for GERD. Antiemetic PRN.  : voiding spontaneously  Heme: c/w lovenox and SCDs for DVT ppx  Endo: Glipizide and ISS for BM control  MSK: PT following  Dispo: Continue routine post-op care, discharge to outpatient rehab facility per PT recommendations    D/w attending Dr. Anival MD PGY2

## 2018-10-30 NOTE — CHART NOTE - NSCHARTNOTEFT_GEN_A_CORE
Advise to continue pt on salt tablets today. Will reassess for additional management base on AM sodium level. Please send repeat urine OSM and urine Na in AM. Monitor mental status. Advise to continue pt on salt tablets today. Will reassess for additional management base on AM sodium level. Please send repeat urine OSM and urine Na in AM. Cont to monitor mental status.

## 2018-10-30 NOTE — PROGRESS NOTE ADULT - ASSESSMENT
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 Hospital course has been complicated by acute blood loss anemia s/p 1 uPRBCs. S/p one episode of emesis overnight.

## 2018-10-30 NOTE — PROGRESS NOTE ADULT - SUBJECTIVE AND OBJECTIVE BOX
R2 GYN ONC PROGRESS NOTE    POD#8   HD#9    SUBJECTIVE:    Patient seen and examined at bedside.  Overnight pt continued to have nausea which was somewhat controlled with zofran.  She drank most of a glucerna but then spit up approx 10cc of it.  She was given zofran afterwards and said she felt better.  This morning the patient continues to complain of weakness and intermittent nausea.  She has not been out of bed and refused to walk with PT yesterday.  She endorses flatus.  No BM overnight.  She continues to have poor po intake with nausea.  Denies CP, SOB, N/V, fevers, and chills.    OBJECTIVE:  Vital Signs Last 24 Hours  T(C): 36.4 (10-30-18 @ 05:26), Max: 36.8 (10-29-18 @ 10:24)  HR: 95 (10-30-18 @ 05:26) (90 - 98)  BP: 175/77 (10-30-18 @ 05:26) (167/60 - 180/70)  RR: 16 (10-30-18 @ 05:26) (16 - 16)  SpO2: 95% (10-30-18 @ 05:26) (94% - 96%)    I&O's Summary    28 Oct 2018 07:01  -  29 Oct 2018 07:00  --------------------------------------------------------  IN: 0 mL / OUT: 1950 mL / NET: -1950 mL    29 Oct 2018 07:01  -  30 Oct 2018 06:37  --------------------------------------------------------  IN: 0 mL / OUT: 1510 mL / NET: -1510 mL    Physical Exam:  General: NAD  CV: NR, RR, S1, S2, no M/R/G  Lungs: CTA-B  Abdomen: Soft, non-tender, non-distended, normal BS  Incision: midline vertical c/d/i w/ steris  Ext: No pain or swelling    Labs:                        10.8   9.97  )-----------( 675      ( 29 Oct 2018 06:20 )             33.8                            10.1   11.35 )-----------( 660      ( 28 Oct 2018 04:28 )             32.0   baso 0.4    eos 1.0    imm gran 0.7    lymph 6.6    mono 5.6    poly 85.7     10-29    122<L>  |  84<L>  |  7   ----------------------------<  87  4.6   |  23  |  0.35<L>    Ca    8.1<L>      29 Oct 2018 06:20  Phos  3.0     10-29  Mg     1.9     10-29    Blood Type: O Positive    MEDICATIONS  (STANDING):  acetaminophen   Tablet .. 650 milliGRAM(s) Oral every 6 hours  dextrose 5%. 1000 milliLiter(s) (50 mL/Hr) IV Continuous <Continuous>  dextrose 50% Injectable 25 Gram(s) IV Push once  docusate sodium 100 milliGRAM(s) Oral two times a day  enoxaparin Injectable 40 milliGRAM(s) SubCutaneous daily  glipiZIDE 10 milliGRAM(s) Oral <User Schedule>  glipiZIDE 5 milliGRAM(s) Oral <User Schedule>  insulin lispro (HumaLOG) corrective regimen sliding scale   SubCutaneous three times a day before meals  insulin lispro (HumaLOG) corrective regimen sliding scale   SubCutaneous at bedtime  lisinopril 20 milliGRAM(s) Oral daily  pantoprazole  Injectable 40 milliGRAM(s) IV Push daily  senna 2 Tablet(s) Oral at bedtime    MEDICATIONS  (PRN):  dextrose 40% Gel 15 Gram(s) Oral once PRN Blood Glucose LESS THAN 70 milliGRAM(s)/deciliter  glucagon  Injectable 1 milliGRAM(s) IntraMuscular once PRN Glucose LESS THAN 70 milligrams/deciliter  simethicone 80 milliGRAM(s) Chew every 6 hours PRN Gas  traMADol 25 milliGRAM(s) Oral every 6 hours PRN Moderate Pain (4 - 6)

## 2018-10-30 NOTE — CHART NOTE - NSCHARTNOTEFT_GEN_A_CORE
PA NOTE    Pt seen at bedside. She was alert and oriented X3. Pt denies c/o nausea or vomiting today. Thu breakfast, lunch and currently dinner. Pt states she feels well today and denies any muscle weakness or decrease in strength. Will continue salt tabs as ordered and repeat BMP and urine lytes in AM.

## 2018-10-30 NOTE — PROGRESS NOTE ADULT - ASSESSMENT
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 Hospital course has been complicated by acute blood loss anemia s/p 1 uPRBCs; also by persistent nausea with intermittent episodes of small volume emesis.  Despite nausea/emesis, the pt continues to have flatus and had BMx3 ~2d ago.  She was seen by dietician yesterday who suggested glucerna for anorexia.  Holistic consult was called for declining pt affect.  Also nephrology consult was called for hyponatremia to 122.  Nephrology suggested 1g TID salt tabs for treatment of hypo-osmolar hyponatremia secondary to elevated ADH release in the setting of persistent vomiting.  Appreciate consult recs.  Additionally, pt has been hypertensive to the 170s-180s systolic for the past few days despite her home dose of lisinopril.  At this time plan is to observe BPs but will consider increasing antihypertensives.

## 2018-10-31 DIAGNOSIS — C76.3 MALIGNANT NEOPLASM OF PELVIS: ICD-10-CM

## 2018-10-31 LAB
BASOPHILS # BLD AUTO: 0.04 K/UL — SIGNIFICANT CHANGE UP (ref 0–0.2)
BASOPHILS NFR BLD AUTO: 0.5 % — SIGNIFICANT CHANGE UP (ref 0–2)
BUN SERPL-MCNC: 9 MG/DL — SIGNIFICANT CHANGE UP (ref 7–23)
CALCIUM SERPL-MCNC: 8.3 MG/DL — LOW (ref 8.4–10.5)
CHLORIDE SERPL-SCNC: 87 MMOL/L — LOW (ref 98–107)
CHLORIDE UR-SCNC: 41 MMOL/L — SIGNIFICANT CHANGE UP
CO2 SERPL-SCNC: 25 MMOL/L — SIGNIFICANT CHANGE UP (ref 22–31)
CREAT SERPL-MCNC: 0.36 MG/DL — LOW (ref 0.5–1.3)
EOSINOPHIL # BLD AUTO: 0.16 K/UL — SIGNIFICANT CHANGE UP (ref 0–0.5)
EOSINOPHIL NFR BLD AUTO: 2.1 % — SIGNIFICANT CHANGE UP (ref 0–6)
GLUCOSE BLDC GLUCOMTR-MCNC: 125 MG/DL — HIGH (ref 70–99)
GLUCOSE BLDC GLUCOMTR-MCNC: 134 MG/DL — HIGH (ref 70–99)
GLUCOSE BLDC GLUCOMTR-MCNC: 203 MG/DL — HIGH (ref 70–99)
GLUCOSE BLDC GLUCOMTR-MCNC: 205 MG/DL — HIGH (ref 70–99)
GLUCOSE SERPL-MCNC: 87 MG/DL — SIGNIFICANT CHANGE UP (ref 70–99)
HCT VFR BLD CALC: 34.9 % — SIGNIFICANT CHANGE UP (ref 34.5–45)
HGB BLD-MCNC: 11 G/DL — LOW (ref 11.5–15.5)
IMM GRANULOCYTES # BLD AUTO: 0.06 # — SIGNIFICANT CHANGE UP
IMM GRANULOCYTES NFR BLD AUTO: 0.8 % — SIGNIFICANT CHANGE UP (ref 0–1.5)
LYMPHOCYTES # BLD AUTO: 0.73 K/UL — LOW (ref 1–3.3)
LYMPHOCYTES # BLD AUTO: 9.6 % — LOW (ref 13–44)
MAGNESIUM SERPL-MCNC: 2 MG/DL — SIGNIFICANT CHANGE UP (ref 1.6–2.6)
MCHC RBC-ENTMCNC: 20.1 PG — LOW (ref 27–34)
MCHC RBC-ENTMCNC: 31.5 % — LOW (ref 32–36)
MCV RBC AUTO: 63.8 FL — LOW (ref 80–100)
MONOCYTES # BLD AUTO: 0.76 K/UL — SIGNIFICANT CHANGE UP (ref 0–0.9)
MONOCYTES NFR BLD AUTO: 10 % — SIGNIFICANT CHANGE UP (ref 2–14)
NEUTROPHILS # BLD AUTO: 5.84 K/UL — SIGNIFICANT CHANGE UP (ref 1.8–7.4)
NEUTROPHILS NFR BLD AUTO: 77 % — SIGNIFICANT CHANGE UP (ref 43–77)
NRBC # FLD: 0 — SIGNIFICANT CHANGE UP
OSMOLALITY UR: 273 MOSMO/KG — SIGNIFICANT CHANGE UP (ref 50–1200)
PHOSPHATE SERPL-MCNC: 2.8 MG/DL — SIGNIFICANT CHANGE UP (ref 2.5–4.5)
PLATELET # BLD AUTO: 681 K/UL — HIGH (ref 150–400)
PMV BLD: 8.7 FL — SIGNIFICANT CHANGE UP (ref 7–13)
POTASSIUM SERPL-MCNC: 4.4 MMOL/L — SIGNIFICANT CHANGE UP (ref 3.5–5.3)
POTASSIUM SERPL-SCNC: 4.4 MMOL/L — SIGNIFICANT CHANGE UP (ref 3.5–5.3)
POTASSIUM UR-SCNC: 17.4 MMOL/L — SIGNIFICANT CHANGE UP
RBC # BLD: 5.47 M/UL — HIGH (ref 3.8–5.2)
RBC # FLD: 19.1 % — HIGH (ref 10.3–14.5)
SODIUM SERPL-SCNC: 124 MMOL/L — LOW (ref 135–145)
SODIUM UR-SCNC: 68 MMOL/L — SIGNIFICANT CHANGE UP
WBC # BLD: 7.59 K/UL — SIGNIFICANT CHANGE UP (ref 3.8–10.5)
WBC # FLD AUTO: 7.59 K/UL — SIGNIFICANT CHANGE UP (ref 3.8–10.5)

## 2018-10-31 RX ORDER — SODIUM CHLORIDE 9 MG/ML
1 INJECTION INTRAMUSCULAR; INTRAVENOUS; SUBCUTANEOUS ONCE
Qty: 0 | Refills: 0 | Status: COMPLETED | OUTPATIENT
Start: 2018-10-31 | End: 2018-10-31

## 2018-10-31 RX ORDER — ACETAMINOPHEN 500 MG
650 TABLET ORAL EVERY 6 HOURS
Qty: 0 | Refills: 0 | Status: DISCONTINUED | OUTPATIENT
Start: 2018-10-31 | End: 2018-11-02

## 2018-10-31 RX ADMIN — SODIUM CHLORIDE 1 GRAM(S): 9 INJECTION INTRAMUSCULAR; INTRAVENOUS; SUBCUTANEOUS at 14:04

## 2018-10-31 RX ADMIN — Medication 5 MILLIGRAM(S): at 17:21

## 2018-10-31 RX ADMIN — SODIUM CHLORIDE 1 GRAM(S): 9 INJECTION INTRAMUSCULAR; INTRAVENOUS; SUBCUTANEOUS at 05:14

## 2018-10-31 RX ADMIN — LISINOPRIL 20 MILLIGRAM(S): 2.5 TABLET ORAL at 05:14

## 2018-10-31 RX ADMIN — ENOXAPARIN SODIUM 40 MILLIGRAM(S): 100 INJECTION SUBCUTANEOUS at 14:11

## 2018-10-31 RX ADMIN — SODIUM CHLORIDE 1 GRAM(S): 9 INJECTION INTRAMUSCULAR; INTRAVENOUS; SUBCUTANEOUS at 22:01

## 2018-10-31 RX ADMIN — Medication 100 MILLIGRAM(S): at 14:03

## 2018-10-31 RX ADMIN — Medication 10 MILLIGRAM(S): at 09:06

## 2018-10-31 RX ADMIN — SENNA PLUS 2 TABLET(S): 8.6 TABLET ORAL at 22:01

## 2018-10-31 RX ADMIN — Medication 4: at 14:03

## 2018-10-31 RX ADMIN — Medication 100 MILLIGRAM(S): at 05:14

## 2018-10-31 NOTE — PROGRESS NOTE ADULT - PROBLEM SELECTOR PLAN 1
Neuro: c/w oral analgesics prn  CV: Hemodynamically stable, continue lisinopril for hypertensive control. AM CBC stable.  Pulm: Saturating well on room air, encourage incentive spirometry  GI: Continue glucerna and regular diet; Pepcid for GERD. Antiemetic PRN.  : voiding spontaneously; 1g TID salt tabs for hyponatremia per Nephorology; f/u AM urine osmolality and lytes  Heme: c/w lovenox and SCDs for DVT ppx  Endo: Glipizide and ISS for DM control  MSK: PT following  PSYCH: Holistic care following  Dispo: Continue routine post-op care, discharge to outpatient rehab facility per PT recommendations    YAIMA Holman MD PGY2.

## 2018-10-31 NOTE — CHART NOTE - NSCHARTNOTEFT_GEN_A_CORE
R2 PM NOTE    Pt evaluated at bedside. No acute complaints. Pt tolerated a reg diet. No vomiting since early this AM. She states that she occasionally has episodes of nausea with unknown triggers. She ambulated around the room this afternoon. She continues to pass flatus and void spontaneously.     Patient denies chest pain, shortness of breath, fevers, chills, diarrhea.    -C/w current management  -Pt tolerated NaCl tablets. Will f/u urine collection for urine osmo. AM Na was 124  -Pt tolerating reg diet. Antiemetic prn.   -AM labs      RJ Baca, pgy2 R2 PM NOTE    Pt evaluated at bedside. No acute complaints. Pt tolerated a reg diet. No vomiting since early this AM. She states that she occasionally has episodes of nausea with unknown triggers. She ambulated around the room this afternoon. She continues to pass flatus and void spontaneously.     Patient denies chest pain, shortness of breath, fevers, chills, diarrhea.    -C/w current management  -Pt tolerated NaCl tablets. Will f/u urine collection for urine osmo. AM Na was 124  -Pt tolerating reg diet. Antiemetic prn.   -AM labs      RJ Baca, pgy2    ATTD: I rounded with Dr Cuba ~7p. She reported mukesh diet and ambulated well. Agree with the plan. AWM

## 2018-10-31 NOTE — PROGRESS NOTE ADULT - ASSESSMENT
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 Patient's nausea was improved yesterday and overnight and she was able to tolerate regular diet with salt tabs and protonix and hyponatremia improved to 124.

## 2018-10-31 NOTE — PROGRESS NOTE ADULT - SUBJECTIVE AND OBJECTIVE BOX
R2 GYN ONC PROGRESS NOTE    POD#9   HD#10    SUBJECTIVE:  Patient seen and examined at bedside, no acute overnight events. Pt reports nausea still coming in intermittent waves but improved yesterday and overnight with salt tabs and protonix.  She was able to tolerate all 3 meals yesterday but did not take any glucerna.  She is ambulating with PT/assistance, passing flatus, voiding spontaneously and denies pain.  Denies CP, SOB, N/V, fevers, and chills.    OBJECTIVE:  Vital Signs Last 24 Hours  T(C): 36.3 (10-31-18 @ 05:15), Max: 36.7 (10-30-18 @ 22:00)  HR: 87 (10-31-18 @ 05:15) (83 - 98)  BP: 174/66 (10-31-18 @ 05:15) (138/51 - 174/72)  RR: 17 (10-31-18 @ 05:15) (16 - 18)  SpO2: 96% (10-31-18 @ 05:15) (93% - 97%)    I&O's Summary    29 Oct 2018 07:01  -  30 Oct 2018 07:00  --------------------------------------------------------  IN: 0 mL / OUT: 1510 mL / NET: -1510 mL    30 Oct 2018 07:01  -  31 Oct 2018 06:51  --------------------------------------------------------  IN: 0 mL / OUT: 1900 mL / NET: -1900 mL      Physical Exam:  General: NAD  CV: NR, RR, S1, S2, no M/R/G  Lungs: CTA-B  Abdomen: Soft, non-tender, non-distended, +BS  Incision: midline vertical c/d/i  Ext: No pain or swelling    Labs:                        11.0   7.59  )-----------( 681      ( 31 Oct 2018 04:20 )             34.9   baso 0.5    eos 2.1    imm gran 0.8    lymph 9.6    mono 10.0   poly 77.0                         10.5   9.69  )-----------( 746      ( 30 Oct 2018 05:30 )             32.9                            10.8   9.97  )-----------( 675      ( 29 Oct 2018 06:20 )             33.8         10-31    124<L>  |  87<L>  |  9   ----------------------------<  87  4.4   |  25  |  0.36<L>    Ca    8.3<L>      31 Oct 2018 04:20  Phos  2.8     10-31  Mg     2.0     10-31    Blood Type: O Positive    MEDICATIONS  (STANDING):  dextrose 5%. 1000 milliLiter(s) (50 mL/Hr) IV Continuous <Continuous>  dextrose 50% Injectable 25 Gram(s) IV Push once  docusate sodium 100 milliGRAM(s) Oral two times a day  enoxaparin Injectable 40 milliGRAM(s) SubCutaneous daily  glipiZIDE 10 milliGRAM(s) Oral <User Schedule>  glipiZIDE 5 milliGRAM(s) Oral <User Schedule>  insulin lispro (HumaLOG) corrective regimen sliding scale   SubCutaneous three times a day before meals  insulin lispro (HumaLOG) corrective regimen sliding scale   SubCutaneous at bedtime  lisinopril 20 milliGRAM(s) Oral daily  senna 2 Tablet(s) Oral at bedtime  sodium chloride 1 Gram(s) Oral every 8 hours    MEDICATIONS  (PRN):  acetaminophen   Tablet .. 650 milliGRAM(s) Oral every 6 hours PRN Mild Pain (1 - 3)  dextrose 40% Gel 15 Gram(s) Oral once PRN Blood Glucose LESS THAN 70 milliGRAM(s)/deciliter  glucagon  Injectable 1 milliGRAM(s) IntraMuscular once PRN Glucose LESS THAN 70 milligrams/deciliter  simethicone 80 milliGRAM(s) Chew every 6 hours PRN Gas  traMADol 25 milliGRAM(s) Oral every 6 hours PRN Moderate Pain (4 - 6)

## 2018-11-01 LAB
BUN SERPL-MCNC: 9 MG/DL — SIGNIFICANT CHANGE UP (ref 7–23)
CALCIUM SERPL-MCNC: 8.4 MG/DL — SIGNIFICANT CHANGE UP (ref 8.4–10.5)
CHLORIDE SERPL-SCNC: 92 MMOL/L — LOW (ref 98–107)
CO2 SERPL-SCNC: 23 MMOL/L — SIGNIFICANT CHANGE UP (ref 22–31)
CREAT SERPL-MCNC: 0.36 MG/DL — LOW (ref 0.5–1.3)
GLUCOSE BLDC GLUCOMTR-MCNC: 105 MG/DL — HIGH (ref 70–99)
GLUCOSE BLDC GLUCOMTR-MCNC: 107 MG/DL — HIGH (ref 70–99)
GLUCOSE BLDC GLUCOMTR-MCNC: 125 MG/DL — HIGH (ref 70–99)
GLUCOSE BLDC GLUCOMTR-MCNC: 153 MG/DL — HIGH (ref 70–99)
GLUCOSE SERPL-MCNC: 88 MG/DL — SIGNIFICANT CHANGE UP (ref 70–99)
MAGNESIUM SERPL-MCNC: 2.1 MG/DL — SIGNIFICANT CHANGE UP (ref 1.6–2.6)
PHOSPHATE SERPL-MCNC: 2.9 MG/DL — SIGNIFICANT CHANGE UP (ref 2.5–4.5)
POTASSIUM SERPL-MCNC: 4.2 MMOL/L — SIGNIFICANT CHANGE UP (ref 3.5–5.3)
POTASSIUM SERPL-SCNC: 4.2 MMOL/L — SIGNIFICANT CHANGE UP (ref 3.5–5.3)
SODIUM SERPL-SCNC: 128 MMOL/L — LOW (ref 135–145)

## 2018-11-01 PROCEDURE — 99233 SBSQ HOSP IP/OBS HIGH 50: CPT | Mod: GC

## 2018-11-01 RX ADMIN — SODIUM CHLORIDE 1 GRAM(S): 9 INJECTION INTRAMUSCULAR; INTRAVENOUS; SUBCUTANEOUS at 05:11

## 2018-11-01 RX ADMIN — Medication 100 MILLIGRAM(S): at 05:11

## 2018-11-01 RX ADMIN — SODIUM CHLORIDE 1 GRAM(S): 9 INJECTION INTRAMUSCULAR; INTRAVENOUS; SUBCUTANEOUS at 21:58

## 2018-11-01 RX ADMIN — Medication 10 MILLIGRAM(S): at 08:00

## 2018-11-01 RX ADMIN — LISINOPRIL 20 MILLIGRAM(S): 2.5 TABLET ORAL at 05:11

## 2018-11-01 RX ADMIN — ENOXAPARIN SODIUM 40 MILLIGRAM(S): 100 INJECTION SUBCUTANEOUS at 14:20

## 2018-11-01 RX ADMIN — SODIUM CHLORIDE 1 GRAM(S): 9 INJECTION INTRAMUSCULAR; INTRAVENOUS; SUBCUTANEOUS at 14:20

## 2018-11-01 RX ADMIN — Medication 5 MILLIGRAM(S): at 18:21

## 2018-11-01 NOTE — PROGRESS NOTE ADULT - SUBJECTIVE AND OBJECTIVE BOX
POD# 10  HD# 11    Patient seen and examined at bedside, no acute overnight events. No acute complaints, pain well controlled.  Patient is ambulating, passing flatus, voiding spontaneously, and tolerating regular diet. Denies CP, SOB, N/V, fevers, and chills.    Vital Signs Last 24 Hours  T(C): 36.6 (11-01-18 @ 05:10), Max: 36.9 (10-31-18 @ 20:54)  HR: 79 (11-01-18 @ 05:10) (79 - 92)  BP: 153/53 (11-01-18 @ 05:10) (140/53 - 166/65)  RR: 18 (11-01-18 @ 05:10) (18 - 18)  SpO2: 96% (11-01-18 @ 05:10) (95% - 98%)  tu  I&O's Summary    30 Oct 2018 07:01  -  31 Oct 2018 07:00  --------------------------------------------------------  IN: 0 mL / OUT: 1900 mL / NET: -1900 mL    31 Oct 2018 07:01  -  01 Nov 2018 06:22  --------------------------------------------------------  IN: 0 mL / OUT: 1400 mL / NET: -1400 mL        Physical Exam:  General: NAD  CV: NR, RR, S1, S2, no M/R/G  Lungs: CTA-B  Abdomen: Soft, non-tender, non-distended, +BS  Incision: _ CDI  Ext: No pain or swelling    Labs:                        11.0   7.59  )-----------( 681      ( 31 Oct 2018 04:20 )             34.9   baso 0.5    eos 2.1    imm gran 0.8    lymph 9.6    mono 10.0   poly 77.0                         10.5   9.69  )-----------( 746      ( 30 Oct 2018 05:30 )             32.9   baso x      eos x      imm gran x      lymph x      mono x      poly x        10-31    124<L>  |  87<L>  |  9   ----------------------------<  87  4.4   |  25  |  0.36<L>    Ca    8.3<L>      31 Oct 2018 04:20  Phos  2.8     10-31  Mg     2.0     10-31            Blood Type: O Positive      MEDICATIONS  (STANDING):  dextrose 5%. 1000 milliLiter(s) (50 mL/Hr) IV Continuous <Continuous>  dextrose 50% Injectable 25 Gram(s) IV Push once  docusate sodium 100 milliGRAM(s) Oral two times a day  enoxaparin Injectable 40 milliGRAM(s) SubCutaneous daily  glipiZIDE 10 milliGRAM(s) Oral <User Schedule>  glipiZIDE 5 milliGRAM(s) Oral <User Schedule>  insulin lispro (HumaLOG) corrective regimen sliding scale   SubCutaneous three times a day before meals  insulin lispro (HumaLOG) corrective regimen sliding scale   SubCutaneous at bedtime  lisinopril 20 milliGRAM(s) Oral daily  senna 2 Tablet(s) Oral at bedtime  sodium chloride 1 Gram(s) Oral every 8 hours    MEDICATIONS  (PRN):  acetaminophen   Tablet .. 650 milliGRAM(s) Oral every 6 hours PRN Mild Pain (1 - 3)  dextrose 40% Gel 15 Gram(s) Oral once PRN Blood Glucose LESS THAN 70 milliGRAM(s)/deciliter  glucagon  Injectable 1 milliGRAM(s) IntraMuscular once PRN Glucose LESS THAN 70 milligrams/deciliter  simethicone 80 milliGRAM(s) Chew every 6 hours PRN Gas POD# 10  HD# 11    Patient seen and examined at bedside, no acute overnight events. No acute complaints, pain well controlled.  Patient is ambulating with PT, passing flatus, voiding spontaneously, and tolerating regular diet.  No nausea overnight and she is tolerating salt tabs. No vomiting. Denies CP, SOB, fevers, and chills.    Vital Signs Last 24 Hours  T(C): 36.6 (11-01-18 @ 05:10), Max: 36.9 (10-31-18 @ 20:54)  HR: 79 (11-01-18 @ 05:10) (79 - 92)  BP: 153/53 (11-01-18 @ 05:10) (140/53 - 166/65)  RR: 18 (11-01-18 @ 05:10) (18 - 18)  SpO2: 96% (11-01-18 @ 05:10) (95% - 98%)  tu  I&O's Summary    30 Oct 2018 07:01  -  31 Oct 2018 07:00  --------------------------------------------------------  IN: 0 mL / OUT: 1900 mL / NET: -1900 mL    31 Oct 2018 07:01  -  01 Nov 2018 06:22  --------------------------------------------------------  IN: 0 mL / OUT: 1400 mL / NET: -1400 mL        Physical Exam:  General: NAD  CV: NR, RR, S1, S2, no M/R/G  Lungs: CTA-B  Abdomen: Soft, non-tender, non-distended, +BS  Incision: vertical incision CDI  Ext: No pain or swelling    Labs:                        11.0   7.59  )-----------( 681      ( 31 Oct 2018 04:20 )             34.9   baso 0.5    eos 2.1    imm gran 0.8    lymph 9.6    mono 10.0   poly 77.0                         10.5   9.69  )-----------( 746      ( 30 Oct 2018 05:30 )             32.9   baso x      eos x      imm gran x      lymph x      mono x      poly x        10-31    124<L>  |  87<L>  |  9   ----------------------------<  87  4.4   |  25  |  0.36<L>    Ca    8.3<L>      31 Oct 2018 04:20  Phos  2.8     10-31  Mg     2.0     10-31            Blood Type: O Positive      MEDICATIONS  (STANDING):  dextrose 5%. 1000 milliLiter(s) (50 mL/Hr) IV Continuous <Continuous>  dextrose 50% Injectable 25 Gram(s) IV Push once  docusate sodium 100 milliGRAM(s) Oral two times a day  enoxaparin Injectable 40 milliGRAM(s) SubCutaneous daily  glipiZIDE 10 milliGRAM(s) Oral <User Schedule>  glipiZIDE 5 milliGRAM(s) Oral <User Schedule>  insulin lispro (HumaLOG) corrective regimen sliding scale   SubCutaneous three times a day before meals  insulin lispro (HumaLOG) corrective regimen sliding scale   SubCutaneous at bedtime  lisinopril 20 milliGRAM(s) Oral daily  senna 2 Tablet(s) Oral at bedtime  sodium chloride 1 Gram(s) Oral every 8 hours    MEDICATIONS  (PRN):  acetaminophen   Tablet .. 650 milliGRAM(s) Oral every 6 hours PRN Mild Pain (1 - 3)  dextrose 40% Gel 15 Gram(s) Oral once PRN Blood Glucose LESS THAN 70 milliGRAM(s)/deciliter  glucagon  Injectable 1 milliGRAM(s) IntraMuscular once PRN Glucose LESS THAN 70 milligrams/deciliter  simethicone 80 milliGRAM(s) Chew every 6 hours PRN Gas

## 2018-11-01 NOTE — PROGRESS NOTE ADULT - PROBLEM SELECTOR PLAN 1
Pt with  hyponatremia secondary to elevated ADH release in the setting of persistent vomiting. Pt with elevated urine osm of 488 and urine Na of 163. Now on salt tabs 1 g tid. Continue salt tabs, give Lasix 40 mg IV X1.  Outpt follow up in 2 weeks  at [address] [place] [address] ([phone]).  Monitor serum sodium and continue symptomatic relief of nausea. Pt with  hyponatremia secondary to elevated ADH release in the setting of persistent vomiting. Pt with elevated urine osm of 488 and urine Na of 163. Now on salt tabs 1 g tid. Continue salt tabs,.  Outpt follow up in 2 weeks  at [address] [place] [address] ([phone]).  Monitor serum sodium and continue symptomatic relief of nausea.

## 2018-11-01 NOTE — PROGRESS NOTE ADULT - SUBJECTIVE AND OBJECTIVE BOX
[facility] DIVISION OF KIDNEY DISEASES AND HYPERTENSION -- FOLLOW UP NOTE  --------------------------------------------------------------------------------    HPI  79 yo female with PMH DM2, RA, hypertension, and macular degeneration s/p Total abdominal hysterectomy and bilateral salpingo-oophorectomy, Resection of left pelvic mass, Pelvic lymph node dissection, Dissection of para-aortic lymph nodes, Peritoneal biopsy on 10/22/18. Nephrology consulted for hyponatremia. Pt admitted with Serum sodium of 133 on 10/23/18. Pt now with decreased serum sodium of 122 attributed to nausea/vomiting.    Pt seen and examined. States she feels well. Denies nausea, vomiting           PAST HISTORY  --------------------------------------------------------------------------------  No significant changes to PMH, PSH, FHx, SHx, unless otherwise noted    ALLERGIES & MEDICATIONS  --------------------------------------------------------------------------------  Allergies    Benadryl (Unknown)  codeine (Rash)  patient states mushrooms make her &quot;purple&quot; (Unknown)  penicillin (Rash)  sulfa drugs (Rash (Mild to Mod))    Intolerances      Standing Inpatient Medications  dextrose 5%. 1000 milliLiter(s) IV Continuous <Continuous>  dextrose 50% Injectable 25 Gram(s) IV Push once  docusate sodium 100 milliGRAM(s) Oral two times a day  enoxaparin Injectable 40 milliGRAM(s) SubCutaneous daily  glipiZIDE 10 milliGRAM(s) Oral <User Schedule>  glipiZIDE 5 milliGRAM(s) Oral <User Schedule>  insulin lispro (HumaLOG) corrective regimen sliding scale   SubCutaneous three times a day before meals  insulin lispro (HumaLOG) corrective regimen sliding scale   SubCutaneous at bedtime  lisinopril 20 milliGRAM(s) Oral daily  senna 2 Tablet(s) Oral at bedtime  sodium chloride 1 Gram(s) Oral every 8 hours    PRN Inpatient Medications  acetaminophen   Tablet .. 650 milliGRAM(s) Oral every 6 hours PRN  dextrose 40% Gel 15 Gram(s) Oral once PRN  glucagon  Injectable 1 milliGRAM(s) IntraMuscular once PRN  simethicone 80 milliGRAM(s) Chew every 6 hours PRN      REVIEW OF SYSTEMS  --------------------------------------------------------------------------------  Gen: No weakness  Skin: No rashes  Head/Eyes/Ears/Mouth: No headache  Respiratory: No dyspnea  CV: No chest pain, PND, orthopnea  GI: No abdominal pain, diarrhea, constipation, no nausea/ vomiting  : No increased frequency  MSK: No joint pain/swelling; no back pain; no edema  Neuro: No dizziness/lightheadedness    VITALS/PHYSICAL EXAM  --------------------------------------------------------------------------------  T(C): 36.9 (11-01-18 @ 14:18), Max: 36.9 (10-31-18 @ 20:54)  HR: 89 (11-01-18 @ 14:18) (79 - 92)  BP: 150/58 (11-01-18 @ 14:18) (138/56 - 164/56)  RR: 18 (11-01-18 @ 14:18) (18 - 18)  SpO2: 98% (11-01-18 @ 14:18) (96% - 98%)  Wt(kg): --        10-31-18 @ 07:01  -  11-01-18 @ 07:00  --------------------------------------------------------  IN: 0 mL / OUT: 1400 mL / NET: -1400 mL    11-01-18 @ 07:01  -  11-01-18 @ 16:07  --------------------------------------------------------  IN: 0 mL / OUT: 400 mL / NET: -400 mL      Physical Exam:  	Gen: NAD  	Pulm: CTA B/L  	CV: RRR, S1S2; no rub  	Abd: +BS, soft, nontender/nondistended + healing incision  	UE: Warm  	LE: Warm, no edema  	Psych: Normal affect and mood  	Skin: Warm, without rashes  LABS/STUDIES  --------------------------------------------------------------------------------              11.0   7.59  >-----------<  681      [10-31-18 @ 04:20]              34.9     128  |  92  |  9   ----------------------------<  88      [11-01-18 @ 05:35]  4.2   |  23  |  0.36        Ca     8.4     [11-01-18 @ 05:35]      Mg     2.1     [11-01-18 @ 05:35]      Phos  2.9     [11-01-18 @ 05:35]            Creatinine Trend:  SCr 0.36 [11-01 @ 05:35]  SCr 0.36 [10-31 @ 04:20]  SCr 0.34 [10-30 @ 05:30]  SCr 0.35 [10-29 @ 06:20]  SCr 0.33 [10-28 @ 04:28]      Urine Sodium 68      [10-31-18 @ 17:22]  Urine Potassium 17.4      [10-31-18 @ 17:22]  Urine Chloride 41      [10-31-18 @ 17:22]  Urine Osmolality 273      [10-31-18 @ 17:22]    HbA1c 6.4      [10-09-18 @ 14:20]

## 2018-11-01 NOTE — PROGRESS NOTE ADULT - PROBLEM SELECTOR PLAN 1
Neuro: c/w oral analgesics prn  CV: Hemodynamically stable, continue lisinopril for hypertensive control. F/u AM CBC  Pulm: Saturating well on room air, encourage incentive spirometry  GI: Continue glucerna and regular diet; Pepcid for GERD. Antiemetic PRN.  : voiding spontaneously; 1g TID salt tabs for hyponatremia per Nephorology; Will f/u AM labs.    Heme: c/w lovenox and SCDs for DVT ppx  Endo: Glipizide and ISS for DM control  MSK: PT following  PSYCH: Holistic care following  Path:  Final pathology shows ovarian carcinosarcoma. Case to be discussed at next multidisciplinary tumor board meeting. Pt will continue home lovenox. Diagnosis and prognosis to be discussed by attending.   Dispo: Continue routine post-op care, discharge to outpatient rehab facility per PT recommendations    RJ Baca, pgy2

## 2018-11-01 NOTE — PROGRESS NOTE ADULT - REASON FOR ADMISSION
Pelvic Mass
scheduled surgery
Scheduled surgery
s/p Ex-lap, FEDERICO, BSO, resection of pelvic mass, PPALND, omentectomy, peritoneal biopsy for pelvic mass
Pelvic Mass
Scheduled surgery

## 2018-11-01 NOTE — PROGRESS NOTE ADULT - ASSESSMENT
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

## 2018-11-02 VITALS
SYSTOLIC BLOOD PRESSURE: 133 MMHG | TEMPERATURE: 99 F | OXYGEN SATURATION: 95 % | HEART RATE: 102 BPM | DIASTOLIC BLOOD PRESSURE: 51 MMHG

## 2018-11-02 LAB
BUN SERPL-MCNC: 16 MG/DL — SIGNIFICANT CHANGE UP (ref 7–23)
CALCIUM SERPL-MCNC: 8.5 MG/DL — SIGNIFICANT CHANGE UP (ref 8.4–10.5)
CHLORIDE SERPL-SCNC: 96 MMOL/L — LOW (ref 98–107)
CO2 SERPL-SCNC: 22 MMOL/L — SIGNIFICANT CHANGE UP (ref 22–31)
CREAT SERPL-MCNC: 0.39 MG/DL — LOW (ref 0.5–1.3)
GLUCOSE BLDC GLUCOMTR-MCNC: 101 MG/DL — HIGH (ref 70–99)
GLUCOSE BLDC GLUCOMTR-MCNC: 217 MG/DL — HIGH (ref 70–99)
GLUCOSE SERPL-MCNC: 82 MG/DL — SIGNIFICANT CHANGE UP (ref 70–99)
MAGNESIUM SERPL-MCNC: 2.2 MG/DL — SIGNIFICANT CHANGE UP (ref 1.6–2.6)
PHOSPHATE SERPL-MCNC: 3.1 MG/DL — SIGNIFICANT CHANGE UP (ref 2.5–4.5)
POTASSIUM SERPL-MCNC: 4.1 MMOL/L — SIGNIFICANT CHANGE UP (ref 3.5–5.3)
POTASSIUM SERPL-SCNC: 4.1 MMOL/L — SIGNIFICANT CHANGE UP (ref 3.5–5.3)
SODIUM SERPL-SCNC: 130 MMOL/L — LOW (ref 135–145)

## 2018-11-02 RX ORDER — SODIUM CHLORIDE 9 MG/ML
1 INJECTION INTRAMUSCULAR; INTRAVENOUS; SUBCUTANEOUS
Qty: 0 | Refills: 0 | COMMUNITY
Start: 2018-11-02

## 2018-11-02 RX ORDER — OMEGA-3 ACID ETHYL ESTERS 1 G
1 CAPSULE ORAL
Qty: 0 | Refills: 0 | COMMUNITY

## 2018-11-02 RX ORDER — ACETAMINOPHEN 500 MG
2 TABLET ORAL
Qty: 0 | Refills: 0 | COMMUNITY

## 2018-11-02 RX ADMIN — Medication 4: at 12:32

## 2018-11-02 RX ADMIN — LISINOPRIL 20 MILLIGRAM(S): 2.5 TABLET ORAL at 05:55

## 2018-11-02 RX ADMIN — Medication 10 MILLIGRAM(S): at 09:07

## 2018-11-02 RX ADMIN — SODIUM CHLORIDE 1 GRAM(S): 9 INJECTION INTRAMUSCULAR; INTRAVENOUS; SUBCUTANEOUS at 13:21

## 2018-11-02 RX ADMIN — SODIUM CHLORIDE 1 GRAM(S): 9 INJECTION INTRAMUSCULAR; INTRAVENOUS; SUBCUTANEOUS at 05:55

## 2018-11-02 RX ADMIN — ENOXAPARIN SODIUM 40 MILLIGRAM(S): 100 INJECTION SUBCUTANEOUS at 12:33

## 2018-11-02 NOTE — PROGRESS NOTE ADULT - SUBJECTIVE AND OBJECTIVE BOX
POD# 11   HD# 12    Patient seen and examined at bedside. No acute overnight events. No acute complaints, pain well controlled. Patient is ambulating. She is passing flatus. Voiding spontaneously and tolerating regular diet. Denies CP, SOB, N/V, fevers, and chills.    Vital Signs Last 24 Hours  T(C): 36.6 (11-02-18 @ 05:55), Max: 37 (11-01-18 @ 16:48)  HR: 90 (11-02-18 @ 05:55) (80 - 96)  BP: 148/58 (11-02-18 @ 05:55) (138/56 - 169/62)  RR: 16 (11-02-18 @ 05:55) (16 - 18)  SpO2: 96% (11-02-18 @ 05:55) (91% - 98%)    I&O's Summary    01 Nov 2018 07:01  -  02 Nov 2018 07:00  --------------------------------------------------------  IN: 0 mL / OUT: 1550 mL / NET: -1550 mL        Physical Exam:  General: AOx3 NAD  CV: RRR  Lungs: CTAB  Abdomen: Soft, non-tender, non-distended, +BS  Incision: midline vertical incision C/D/I, healing well  Ext: No pain or swelling    Labs:                        11.0   7.59  )-----------( 681      ( 31 Oct 2018 04:20 )             34.9   baso 0.5    eos 2.1    imm gran 0.8    lymph 9.6    mono 10.0   poly 77.0       MEDICATIONS  (STANDING):  dextrose 5%. 1000 milliLiter(s) (50 mL/Hr) IV Continuous <Continuous>  dextrose 50% Injectable 25 Gram(s) IV Push once  docusate sodium 100 milliGRAM(s) Oral two times a day  enoxaparin Injectable 40 milliGRAM(s) SubCutaneous daily  glipiZIDE 10 milliGRAM(s) Oral <User Schedule>  glipiZIDE 5 milliGRAM(s) Oral <User Schedule>  insulin lispro (HumaLOG) corrective regimen sliding scale   SubCutaneous three times a day before meals  insulin lispro (HumaLOG) corrective regimen sliding scale   SubCutaneous at bedtime  lisinopril 20 milliGRAM(s) Oral daily  senna 2 Tablet(s) Oral at bedtime  sodium chloride 1 Gram(s) Oral every 8 hours    MEDICATIONS  (PRN):  acetaminophen   Tablet .. 650 milliGRAM(s) Oral every 6 hours PRN Mild Pain (1 - 3)  dextrose 40% Gel 15 Gram(s) Oral once PRN Blood Glucose LESS THAN 70 milliGRAM(s)/deciliter  glucagon  Injectable 1 milliGRAM(s) IntraMuscular once PRN Glucose LESS THAN 70 milligrams/deciliter  simethicone 80 milliGRAM(s) Chew every 6 hours PRN Gas

## 2018-11-02 NOTE — PROGRESS NOTE ADULT - PROBLEM SELECTOR PROBLEM 1
Hyponatremia
Post-operative state
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
Post-operative state

## 2018-11-02 NOTE — PROGRESS NOTE ADULT - ATTENDING COMMENTS
Pt seen and examined with team, no n/v/cp/sob  [place] reg diet  abd soft/nt/nd  incision c/d/i  Ext NT  Plan  hyponatremia improved Na 130 this am  continue salt tabs per renal  dvt proph lovenox/scd's  patient planned for d/c to rehab later today.
Patient seen and examined at bedside with team at 2:30pm, agree with above gyn resident note, including assessment and plan. Abdomen benign. Patient does not feel ready to go to rehab today, all questions answered. Continue current care.
Pt seen and examined, agree with gyn PA  Continue antiemetics  PPI for reflux symptoms
Pt seen and examined, agree with gyn housestaff  POD#8  Await AM labs, appreciate renal input for hyponatremia  Continue antiemetics
Patient seen and evaluated.   Clinically improving.   Hyponatremia is improved. Will discuss with nephrology parameters for DC.   Otherwise stable to transfer to rehab.
Patient seen and examined.   Overall doing well.   Small emesis this am. Passing flatus but abdomen is moderately distended and tympanitic.   Continue regular diet as tolerated.   Rehab planning.
Pt seen and examined, agree with gyn housestaff  POD#1  Routine postop care  PT consult
Seen with Rs and F at ~730a. Agree with eval and plan. Transfusion discussed. Follow Na FS. Instructions discussed.
Await improvement in nausea prior to transfer to rehab.  In addition, pending evaluation of hyponatremia (122); case discussed with the patient's PMD Dr. Alvarez who recommended Renal Consult as no prior history or predisposition identified.  Continue fluid restriction and check urine electrolytes.    Rocio Harrington MD

## 2018-11-02 NOTE — PROGRESS NOTE ADULT - PROVIDER SPECIALTY LIST ADULT
Gyn Onc
Pain Medicine
Gyn Onc
Gyn Onc
Nephrology

## 2018-11-02 NOTE — CHART NOTE - NSCHARTNOTEFT_GEN_A_CORE
Source: Patient [x] Medical record reviewed. Patient seen for malnutrition/length of stay nutrition follow-up. Per chart review, patient  POD#11 s/p Ex-lap, FEDERICO, BSO, resection of pelvic mass, PPALND, omentectomy, peritoneal biopsy for pelvic mass, post op course complicated by ileus now resolved. Patient previously with recurrent nausea/vomiting and ibability to tolerate PO. Patient reports over the past 2 days she has been feeling better and tolerating meals. States she was feeling "very hungry" today and ordered extra protein. No GI distress (nausea/vomiting/diarrhea/constipation) noted at this time. Encouraged PO intake as tolerated and consuming High Biological Value Protein sources (i.e. chicken, turkey, beef, fish, eggs, etc.).    Diet : Consistent Carbohydrate w/ evening snack and Pertino Nutrition Shake 240mls 3x daily (660kcals, 30g protein)   PO intake:  < 50% [ ] 50-75% [x]   % [ ]  other :    Current Weight: no new weight, 51.7kg (10/22)    Pertinent Medications: MEDICATIONS  (STANDING):  dextrose 5%. 1000 milliLiter(s) (50 mL/Hr) IV Continuous <Continuous>  dextrose 50% Injectable 25 Gram(s) IV Push once  docusate sodium 100 milliGRAM(s) Oral two times a day  enoxaparin Injectable 40 milliGRAM(s) SubCutaneous daily  glipiZIDE 10 milliGRAM(s) Oral <User Schedule>  glipiZIDE 5 milliGRAM(s) Oral <User Schedule>  insulin lispro (HumaLOG) corrective regimen sliding scale   SubCutaneous three times a day before meals  insulin lispro (HumaLOG) corrective regimen sliding scale   SubCutaneous at bedtime  lisinopril 20 milliGRAM(s) Oral daily  senna 2 Tablet(s) Oral at bedtime  sodium chloride 1 Gram(s) Oral every 8 hours    MEDICATIONS  (PRN):  acetaminophen   Tablet .. 650 milliGRAM(s) Oral every 6 hours PRN Mild Pain (1 - 3)  dextrose 40% Gel 15 Gram(s) Oral once PRN Blood Glucose LESS THAN 70 milliGRAM(s)/deciliter  glucagon  Injectable 1 milliGRAM(s) IntraMuscular once PRN Glucose LESS THAN 70 milligrams/deciliter  simethicone 80 milliGRAM(s) Chew every 6 hours PRN Gas    Pertinent Labs:  11-02 Na130 mmol/L<L> Glu 82 mg/dL K+ 4.1 mmol/L Cr  0.39 mg/dL<L> BUN 16 mg/dL 11-02 Phos 3.1 mg/dL 10-30 PAB 12 mg/dL<L> 10-09 [id number] 6.4 %<H>  CAPILLARY BLOOD GLUCOSE  POCT Blood Glucose.: 101 mg/dL (02 Nov 2018 07:51)  POCT Blood Glucose.: 125 mg/dL (01 Nov 2018 21:51)  POCT Blood Glucose.: 105 mg/dL (01 Nov 2018 17:15)    Skin: midline abdomen incisions, left upper posterior buttock/old scar per flowsheets  Edema: 1+ left leg, right leg    Estimated Needs:   [x] no change since previous assessment  [ ] recalculated:     Previous Nutrition Diagnosis:   [x] Malnutrition, severe  Nutrition Diagnosis is [x] ongoing, patient with improved PO intake     Interventions:   1. Continue Consistent Carbohydrate diet with Glucerna Therapeutic Nutrition Shake 240mls 3x daily (660kcals, 30g protein).   2. Please Encourage po intake, assist with meals and menu selections, provide alternatives PRN.      Monitoring and Evaluation:   1. Monitor weights, labs, BM's, skin integrity, p.o. intake.   2. Patient to meet > 75% estimated pro/kcal needs.   3. Tolerance to diet.   RD to remain available, Meagan Cm RD, CDN Pager #[phone]

## 2018-11-02 NOTE — PROGRESS NOTE ADULT - PROBLEM SELECTOR PLAN 1
Neuro: Continue po pain meds   CV: Hemodynamically stable, on lisinopril for BP control  Pulm: Saturating well on room air, encourage oob/amb/IS  GI: consistent carb diet as tolerated, continue mylicon, colace, senna, protonix, pepcid  : UOP adequate, voiding freely. Pt with improving hyponatremia on salt tabs TID. Appreciate renal recs, will f/u final recs today  Heme: c/w lovenox and SCDs for DVT ppx for hypercoagulable state in setting of known malignancy, will be discharged with home lovenox  Endo: ISS, glipizide, check FS  Dispo: D/C to rehab today    Valerie Spencer, PGY-3

## 2018-11-16 ENCOUNTER — RESULT REVIEW (OUTPATIENT)
Age: 78
End: 2018-11-16

## 2018-12-07 ENCOUNTER — APPOINTMENT (OUTPATIENT)
Dept: GYNECOLOGIC ONCOLOGY | Facility: CLINIC | Age: 78
End: 2018-12-07

## 2018-12-08 DIAGNOSIS — R42 DIZZINESS AND GIDDINESS: ICD-10-CM

## 2018-12-08 RX ORDER — MECLIZINE HYDROCHLORIDE 12.5 MG/1
12.5 TABLET ORAL 3 TIMES DAILY
Qty: 21 | Refills: 0 | Status: ACTIVE | COMMUNITY
Start: 2018-12-08 | End: 1900-01-01

## 2018-12-20 ENCOUNTER — TRANSCRIPTION ENCOUNTER (OUTPATIENT)
Age: 78
End: 2018-12-20

## 2022-02-15 NOTE — ED PROVIDER NOTE - NS ED ATTENDING STATEMENT MOD
Sheath #1: Closed using Mynx. I have personally seen and examined this patient.  I have fully participated in the care of this patient. I have reviewed all pertinent clinical information, including history, physical exam, plan and the Resident’s note and agree except as noted.

## 2022-07-05 NOTE — DISCHARGE NOTE ADULT - PROVIDER TOKENS
Spoke with pt and let her know to call the pharmacy to have the prolia shipped to our office in [place]. Pt gave verbal understanding and agreed.    TOKEN:'[id number]'

## 2024-03-04 NOTE — ED PROVIDER NOTE - ENMT, MLM
see chief complaint quote
Airway patent, Nasal mucosa clear. Mouth with normal mucosa. Throat has no vesicles, no oropharyngeal exudates and uvula is midline.